# Patient Record
Sex: FEMALE | Race: BLACK OR AFRICAN AMERICAN | NOT HISPANIC OR LATINO | URBAN - METROPOLITAN AREA
[De-identification: names, ages, dates, MRNs, and addresses within clinical notes are randomized per-mention and may not be internally consistent; named-entity substitution may affect disease eponyms.]

---

## 2024-09-18 ENCOUNTER — TELEPHONE (OUTPATIENT)
Dept: HEPATOLOGY | Facility: CLINIC | Age: 69
End: 2024-09-18
Payer: COMMERCIAL

## 2024-09-18 NOTE — TELEPHONE ENCOUNTER
Spoke with patient and scheduled appointment for 09/20/2024 at 8:30 am with Dr. Samara Moss at [facility] in [place].

## 2024-09-18 NOTE — TELEPHONE ENCOUNTER
----- Message from Camila Chávez RN sent at 9/17/2024  3:16 PM CDT -----    ----- Message -----  From: Melissa Carter MA  Sent: 9/17/2024   9:36 AM CDT  To: [facility] Hepatology Coordinator      ----- Message -----  From: Griselda Porter  Sent: 9/17/2024   9:32 AM CDT  To: [facility] Staff    Patient is calling to check on referral. Please callback [phone]

## 2024-09-20 ENCOUNTER — LAB VISIT (OUTPATIENT)
Dept: LAB | Facility: HOSPITAL | Age: 69
End: 2024-09-20
Attending: INTERNAL MEDICINE
Payer: COMMERCIAL

## 2024-09-20 ENCOUNTER — OFFICE VISIT (OUTPATIENT)
Dept: HEPATOLOGY | Facility: CLINIC | Age: 69
End: 2024-09-20
Payer: COMMERCIAL

## 2024-09-20 VITALS
SYSTOLIC BLOOD PRESSURE: 150 MMHG | BODY MASS INDEX: 27.15 KG/M2 | DIASTOLIC BLOOD PRESSURE: 81 MMHG | HEART RATE: 88 BPM | HEIGHT: 65 IN | WEIGHT: 162.94 LBS

## 2024-09-20 DIAGNOSIS — K75.89 CHOLESTATIC HEPATITIS: ICD-10-CM

## 2024-09-20 DIAGNOSIS — R93.5 ABNORMAL FINDINGS ON DIAGNOSTIC IMAGING OF ABDOMEN: ICD-10-CM

## 2024-09-20 DIAGNOSIS — K75.89 CHOLESTATIC HEPATITIS: Primary | ICD-10-CM

## 2024-09-20 LAB
ALBUMIN SERPL BCP-MCNC: 3.6 G/DL (ref 3.5–5.2)
ALP SERPL-CCNC: 305 U/L (ref 55–135)
ALT SERPL W/O P-5'-P-CCNC: 27 U/L (ref 10–44)
ANION GAP SERPL CALC-SCNC: 10 MMOL/L (ref 8–16)
AST SERPL-CCNC: 36 U/L (ref 10–40)
BASOPHILS # BLD AUTO: 0.04 K/UL (ref 0–0.2)
BASOPHILS NFR BLD: 0.6 % (ref 0–1.9)
BILIRUB SERPL-MCNC: 0.4 MG/DL (ref 0.1–1)
BUN SERPL-MCNC: 10 MG/DL (ref 8–23)
CALCIUM SERPL-MCNC: 10 MG/DL (ref 8.7–10.5)
CHLORIDE SERPL-SCNC: 107 MMOL/L (ref 95–110)
CO2 SERPL-SCNC: 25 MMOL/L (ref 23–29)
CREAT SERPL-MCNC: 0.8 MG/DL (ref 0.5–1.4)
DIFFERENTIAL METHOD BLD: ABNORMAL
EOSINOPHIL # BLD AUTO: 0.1 K/UL (ref 0–0.5)
EOSINOPHIL NFR BLD: 1.1 % (ref 0–8)
ERYTHROCYTE [DISTWIDTH] IN BLOOD BY AUTOMATED COUNT: 16.3 % (ref 11.5–14.5)
EST. GFR  (NO RACE VARIABLE): >60 ML/MIN/1.73 M^2
GGT SERPL-CCNC: 570 U/L (ref 8–55)
GLUCOSE SERPL-MCNC: 124 MG/DL (ref 70–110)
HAV IGG SER QL IA: REACTIVE
HBV SURFACE AB SER-ACNC: <3 MIU/ML
HBV SURFACE AB SER-ACNC: NORMAL M[IU]/ML
HBV SURFACE AG SERPL QL IA: NORMAL
HCT VFR BLD AUTO: 36 % (ref 37–48.5)
HGB BLD-MCNC: 11.4 G/DL (ref 12–16)
HIV 1+2 AB+HIV1 P24 AG SERPL QL IA: NORMAL
IMM GRANULOCYTES # BLD AUTO: 0.02 K/UL (ref 0–0.04)
IMM GRANULOCYTES NFR BLD AUTO: 0.3 % (ref 0–0.5)
LYMPHOCYTES # BLD AUTO: 2 K/UL (ref 1–4.8)
LYMPHOCYTES NFR BLD: 32.4 % (ref 18–48)
MCH RBC QN AUTO: 27.3 PG (ref 27–31)
MCHC RBC AUTO-ENTMCNC: 31.7 G/DL (ref 32–36)
MCV RBC AUTO: 86 FL (ref 82–98)
MONOCYTES # BLD AUTO: 0.5 K/UL (ref 0.3–1)
MONOCYTES NFR BLD: 7.3 % (ref 4–15)
NEUTROPHILS # BLD AUTO: 3.6 K/UL (ref 1.8–7.7)
NEUTROPHILS NFR BLD: 58.3 % (ref 38–73)
NRBC BLD-RTO: 0 /100 WBC
PLATELET # BLD AUTO: 378 K/UL (ref 150–450)
PMV BLD AUTO: 10.1 FL (ref 9.2–12.9)
POTASSIUM SERPL-SCNC: 4.4 MMOL/L (ref 3.5–5.1)
PROT SERPL-MCNC: 8.2 G/DL (ref 6–8.4)
RBC # BLD AUTO: 4.17 M/UL (ref 4–5.4)
SODIUM SERPL-SCNC: 142 MMOL/L (ref 136–145)
WBC # BLD AUTO: 6.2 K/UL (ref 3.9–12.7)

## 2024-09-20 PROCEDURE — 86381 MITOCHONDRIAL ANTIBODY EACH: CPT | Performed by: INTERNAL MEDICINE

## 2024-09-20 PROCEDURE — 86015 ACTIN ANTIBODY EACH: CPT | Performed by: INTERNAL MEDICINE

## 2024-09-20 PROCEDURE — 99999 PR PBB SHADOW E&M-EST. PATIENT-LVL IV: CPT | Mod: PBBFAC,,, | Performed by: INTERNAL MEDICINE

## 2024-09-20 PROCEDURE — 80053 COMPREHEN METABOLIC PANEL: CPT | Performed by: INTERNAL MEDICINE

## 2024-09-20 PROCEDURE — 87389 HIV-1 AG W/HIV-1&-2 AB AG IA: CPT | Performed by: INTERNAL MEDICINE

## 2024-09-20 PROCEDURE — 82977 ASSAY OF GGT: CPT | Performed by: INTERNAL MEDICINE

## 2024-09-20 PROCEDURE — 86038 ANTINUCLEAR ANTIBODIES: CPT | Performed by: INTERNAL MEDICINE

## 2024-09-20 PROCEDURE — 87340 HEPATITIS B SURFACE AG IA: CPT | Performed by: INTERNAL MEDICINE

## 2024-09-20 PROCEDURE — 86706 HEP B SURFACE ANTIBODY: CPT | Mod: 91 | Performed by: INTERNAL MEDICINE

## 2024-09-20 PROCEDURE — 85025 COMPLETE CBC W/AUTO DIFF WBC: CPT | Performed by: INTERNAL MEDICINE

## 2024-09-20 PROCEDURE — 36415 COLL VENOUS BLD VENIPUNCTURE: CPT | Performed by: INTERNAL MEDICINE

## 2024-09-20 PROCEDURE — 86039 ANTINUCLEAR ANTIBODIES (ANA): CPT | Performed by: INTERNAL MEDICINE

## 2024-09-20 PROCEDURE — 86235 NUCLEAR ANTIGEN ANTIBODY: CPT | Mod: 59 | Performed by: INTERNAL MEDICINE

## 2024-09-20 PROCEDURE — 86790 VIRUS ANTIBODY NOS: CPT | Performed by: INTERNAL MEDICINE

## 2024-09-20 RX ORDER — BLOOD SUGAR DIAGNOSTIC
STRIP MISCELLANEOUS
COMMUNITY

## 2024-09-20 RX ORDER — LEVOCETIRIZINE DIHYDROCHLORIDE 5 MG/1
1 TABLET, FILM COATED ORAL NIGHTLY
COMMUNITY
Start: 2024-07-08

## 2024-09-20 RX ORDER — ATORVASTATIN CALCIUM 80 MG/1
80 TABLET, FILM COATED ORAL
COMMUNITY

## 2024-09-20 RX ORDER — LOSARTAN POTASSIUM 100 MG/1
100 TABLET ORAL
COMMUNITY

## 2024-09-20 RX ORDER — DULAGLUTIDE 1.5 MG/.5ML
1.5 INJECTION, SOLUTION SUBCUTANEOUS WEEKLY
COMMUNITY

## 2024-09-20 RX ORDER — AMLODIPINE BESYLATE 10 MG/1
1 TABLET ORAL EVERY MORNING
COMMUNITY

## 2024-09-20 RX ORDER — METFORMIN HYDROCHLORIDE 500 MG/1
1000 TABLET ORAL
COMMUNITY
Start: 2024-08-13

## 2024-09-20 RX ORDER — PANTOPRAZOLE SODIUM 40 MG/1
40 TABLET, DELAYED RELEASE ORAL
COMMUNITY
Start: 2024-07-30

## 2024-09-20 NOTE — PROGRESS NOTES
Subjective:     Alayna Sampson is here for evaluation of Abnormal Abdominal/Liver Imaging and Mass      HPI  Alayna Sampson is here for evaluation of abnormal imaging and concern for an autoimmune liver disease.  Of note the patient had been in her usual state of health and had gotten regular labs with some abnormalities in the liver tests.  Seems like mostly elevated alkaline phosphatase.  Evaluation was started imaging suggested a masslike lesion in the liver causing some biliary dilatation.  It seems like there were concern for Klatskin tumor.  She had a biopsy that did not show malignancy but showed concern for possible autoimmune hepatitis although her AST and ALT are normal.  It seems like she primarily has not elevation of alkaline phosphatase.  A year ago her labs were normal.     Outside labs were reviewed and hepatitis C was negative, AB positive, antimitochondrial antibody negative IgG subclasses normal    Overall she has been feeling fairly well.  Initially she was having some right upper quadrant abdominal pain with eating but then was treated for H pylori and reports that got better now it was more rare and intermittent and mild in severity.  She has noticed slight decrease in appetite but for the most part has not felt sick.  She denies any known history of liver disease.    8/2024  Liver, mass, core biopsy:                                               - Liver parenchyma with areas of collapse, negative for malignancy,     see comment.       7/2024 MRCP  Infiltrative masslike process of the central aspects of the right and left lobes of the liver which surrounds the central biliary tree and causes beading and stricturing of the intrahepatic biliary tree. Consider Klatskin's tumor (cholangiocarcinoma), but infectious or inflammatory cholangitis or less likely primary biliary cirrhosis are secondary considerations. Biopsy is recommended.     No evidence of high-grade intrahepatic or extrahepatic  "bile duct obstruction.     Review of Systems    Objective:     Physical Exam  Vitals reviewed.   Constitutional:       General: She is not in acute distress.     Appearance: She is well-developed.   HENT:      Head: Normocephalic and atraumatic.      Mouth/Throat:      Pharynx: No oropharyngeal exudate.   Eyes:      General: No scleral icterus.        Right eye: No discharge.         Left eye: No discharge.      Conjunctiva/sclera: Conjunctivae normal.      Pupils: Pupils are equal, round, and reactive to light.   Pulmonary:      Effort: Pulmonary effort is normal. No respiratory distress.      Breath sounds: Normal breath sounds. No wheezing.   Abdominal:      General: There is no distension.      Palpations: Abdomen is soft.      Tenderness: There is no abdominal tenderness.   Musculoskeletal:      Right lower leg: No edema.      Left lower leg: No edema.   Neurological:      Mental Status: She is alert and oriented to person, place, and time.   Psychiatric:         Behavior: Behavior normal.         Computed MELD 3.0 unavailable. One or more values for this score either were not found within the given timeframe or did not fit some other criterion.  Computed MELD-Na unavailable. One or more values for this score either were not found within the given timeframe or did not fit some other criterion.      No results found for: "WBC", "HGB", "HCT", "PLT"  No results found for: "BUN", "CREATININE", "GLU", "CALCIUM", "NA", "K", "CL", "MG"  No results found for: "AST", "ALT", "ALKPHOS", "BILITOT", "ALBUMIN"  INR   Date Value Ref Range Status   08/21/2024 1.1  Final         Assessment/Plan:     1. Cholestatic hepatitis    2. Abnormal findings on diagnostic imaging of abdomen      Alayna Sampson is a 68 y.o. female withAbnormal Abdominal/Liver Imaging and Mass    Cholestatic hepatitis-unclear etiology; need to evaluate all of the information that she has already had gathered.  The complete picture does not come together as " her pattern of injury is not consistent with an autoimmune hepatitis with looks like more of a biliary pattern of the injury but further evaluation is needed.  -would like to have her outside imaging reviewed at radiology conference  -liver pathology sent for pathology conference review  -will get some labs updated and repeated  -     Anti-Smooth Muscle Antibody; Future; Expected date: 09/20/2024  -     AB Screen w/Reflex; Future; Expected date: 09/20/2024  -     Antimitochondrial Antibody; Future; Expected date: 09/20/2024  -     Gamma GT; Standing  -     Hepatitis A antibody, IgG; Future; Expected date: 09/20/2024  -     Comprehensive Metabolic Panel; Standing  -     CBC Auto Differential; Standing  -     Hepatitis B Surface Ab, Qualitative; Future; Expected date: 09/20/2024  -     Hepatitis B Surface Antigen; Future; Expected date: 09/20/2024  -     HIV 1/2 Ag/Ab (4th Gen); Future; Expected date: 09/20/2024  -     Specimen to Pathology Liver; Future; Expected date: 09/20/2024    Abnormal findings on diagnostic imaging of abdomen  -will have imaging reviewed at radiology conference  -patient may need an EUS for further characterization and repeat biopsy  -     Comprehensive Metabolic Panel; Standing  -     CBC Auto Differential; Standing  -     HIV 1/2 Ag/Ab (4th Gen); Future; Expected date: 09/20/2024  -     Specimen to Pathology Liver; Future; Expected date: 09/20/2024      Return to clinic in 3 months with preclinic labs but will be coordinating care in the meantime    Samara Moss MD

## 2024-09-23 LAB
ANA PATTERN 1: NORMAL
ANA SER QL IF: POSITIVE
ANA TITR SER IF: NORMAL {TITER}

## 2024-09-24 LAB
MITOCHONDRIA AB TITR SER IF: NORMAL {TITER}
SMOOTH MUSCLE AB TITR SER IF: NORMAL {TITER}

## 2024-09-25 LAB
ANTI SM ANTIBODY: 0.07 RATIO (ref 0–0.99)
ANTI SM/RNP ANTIBODY: 0.06 RATIO (ref 0–0.99)
ANTI-SM INTERPRETATION: NEGATIVE
ANTI-SM/RNP INTERPRETATION: NEGATIVE
ANTI-SSA ANTIBODY: 0.07 RATIO (ref 0–0.99)
ANTI-SSA INTERPRETATION: NEGATIVE
ANTI-SSB ANTIBODY: 0.07 RATIO (ref 0–0.99)
ANTI-SSB INTERPRETATION: NEGATIVE
DSDNA AB SER-ACNC: NORMAL [IU]/ML

## 2024-10-15 ENCOUNTER — PATIENT MESSAGE (OUTPATIENT)
Dept: HEPATOLOGY | Facility: CLINIC | Age: 69
End: 2024-10-15
Payer: COMMERCIAL

## 2024-10-16 ENCOUNTER — TELEPHONE (OUTPATIENT)
Dept: HEPATOLOGY | Facility: CLINIC | Age: 69
End: 2024-10-16
Payer: COMMERCIAL

## 2024-10-16 NOTE — TELEPHONE ENCOUNTER
"Patient: Alayna Sampson       MRN: [id number]      : 1955     Age: 68 y.o.  [address]  [place] [address]    Presenting Radiologists:     Providers: Samara Moss MD    Priority of review: Other    Patient Transplant Status: Hepatology    Reason for presentation: Indeterminate lesion    Clinical Summary: 68F with cholestatic hep imaging was concerning for Klatskin but bx neg for malignancy outside questions autoimmune hepatitis but actually labs and bx not suggestive of autoimmune hep, need to make sure no concern for cancer.     Imaging to be reviewed: Outside imaging     HCC Treatment History: n/a    Platelets:   Lab Results   Component Value Date/Time     2024 09:15 AM     Creatinine:   Lab Results   Component Value Date/Time    CREATININE 0.8 2024 09:15 AM     Bilirubin:   Lab Results   Component Value Date/Time    BILITOT 0.4 2024 09:15 AM     AFP Last 3 each if available: No results found for: "AFP", "EXTAFP"    MELD: Computed MELD 3.0 unavailable. One or more values for this score either were not found within the given timeframe or did not fit some other criterion.  Computed MELD-Na unavailable. One or more values for this score either were not found within the given timeframe or did not fit some other criterion.      Plan:     Follow-up Provider:   "

## 2024-10-16 NOTE — TELEPHONE ENCOUNTER
We have reports of patient's imaging we need her actual imaging to be uploaded so it can be reviewed at IR conference.  Please let me know what we are in terms of getting the actual disc it having the images uploaded.

## 2024-10-16 NOTE — TELEPHONE ENCOUNTER
Patient just dropped them off this morning. Radiology will have them uploaded within 20 minutes or so.

## 2024-10-21 ENCOUNTER — TELEPHONE (OUTPATIENT)
Dept: HEPATOLOGY | Facility: HOSPITAL | Age: 69
End: 2024-10-21
Payer: COMMERCIAL

## 2024-10-21 NOTE — TELEPHONE ENCOUNTER
IR Liver Pathology Conference Note    Patient:  Alayna Sampson  MRN:   [id number]  YOB: 1955  Date of Transplant:  N/A  Native Diagnosis:     Discussion/Plan:    Presenter: Hepatologist - Samara Moss MD    Reason for presenting: diagnosis confirmation    Concerns for Pathologists:  69-year-old woman with cholestatic hepatitis and concern for possible bile duct mass but when she had a biopsy there was no evidence of a mass on the biopsy.  Referring doctors we then concern for autoimmune hepatitis.  Labs primarily of a cholestatic picture with an elevated alkaline phosphatase but the bilirubin is normal.  Would like to review outside slides.    Lab Results  WBC   Date Value   09/20/2024 6.20 K/uL   05/29/2024 6.0 x10E3/uL   04/05/2023 5.7 x10E3/uL   10/13/2020 7.9 x10E3/uL     PLT   Date Value   09/20/2024 378 K/uL   05/29/2024 368 x10E3/uL   04/05/2023 354 x10E3/uL   10/13/2020 324 x10E3/uL     INR (no units)   Date Value   08/21/2024 1.1     AST (U/L)   Date Value   09/20/2024 36     ALT (U/L)   Date Value   09/20/2024 27     BILITOT (mg/dL)   Date Value   09/20/2024 0.4     ALKPHOS (U/L)   Date Value   09/20/2024 305 (H)     CREATININE (mg/dL)   Date Value   09/20/2024 0.8

## 2024-10-22 ENCOUNTER — TELEPHONE (OUTPATIENT)
Dept: ENDOSCOPY | Facility: HOSPITAL | Age: 69
End: 2024-10-22
Payer: COMMERCIAL

## 2024-10-22 ENCOUNTER — CONFERENCE (OUTPATIENT)
Dept: TRANSPLANT | Facility: CLINIC | Age: 69
End: 2024-10-22
Payer: COMMERCIAL

## 2024-10-22 ENCOUNTER — TELEPHONE (OUTPATIENT)
Dept: GASTROENTEROLOGY | Facility: CLINIC | Age: 69
End: 2024-10-22
Payer: COMMERCIAL

## 2024-10-22 NOTE — TELEPHONE ENCOUNTER
----- Message from Griselda Sotelo sent at 10/22/2024 10:21 AM CDT -----  Regarding: urgent appointment      Good Morning,       This patient was reviewed in Liver Conference with the recommendation for ERCP with brushings to rule out hilar cholangiocarcinoma.  Please assist the patient with an appointment.  Dr Moss will be informing the patient later today.      Thanks  Jo-Ann

## 2024-10-22 NOTE — TELEPHONE ENCOUNTER
"Patient: Alayna Sampson       MRN: [id number]      : 1955     Age: 68 y.o.  [address]  [place] [address]     Presenting Radiologists: Daryl Christie MD     Providers: Samara Moss MD     Priority of review: Other     Patient Transplant Status: Hepatology     Reason for presentation: Indeterminate lesion     Clinical Summary: 68F with cholestatic hep imaging was concerning for Klatskin but bx neg for malignancy outside questions autoimmune hepatitis but actually labs and bx not suggestive of autoimmune hep, need to make sure no concern for cancer.      Imaging to be reviewed: Outside imaging      HCC Treatment History: n/a     Platelets:         Lab Results   Component Value Date/Time      2024 09:15 AM      Creatinine:         Lab Results   Component Value Date/Time     CREATININE 0.8 2024 09:15 AM      Bilirubin:         Lab Results   Component Value Date/Time     BILITOT 0.4 2024 09:15 AM      AFP Last 3 each if available: No results found for: "AFP", "EXTAFP"     MELD: Computed MELD 3.0 unavailable. One or more values for this score either were not found within the given timeframe or did not fit some other criterion.  Computed MELD-Na unavailable. One or more values for this score either were not found within the given timeframe or did not fit some other criterion.        Plan: Difficult to evaluate outside MRI, which shows intrahepatic duct dilation with central stricture and central duct enhancement.  Percutaneous biopsy was done on .  Would consider ERCP and brushing.          Discussion: Previous BX ws done percutaneously,which is kind of blindly BX.   For a clad skin type tumor it is more  advantageous to do ERCP. Needs an ERCP with brushings if suspicious for cholangio.   The duct needs to be BX .  ERCP with brushing to look for a hilar  cholangio.  It does look strictured , there is enhancement of the ducts, which could be related to an inflammatory process.  The most " appropriate approach to tissue sample is probably ERCP.   Needs updated imaging  ([facility], or main campus for a better quality) and CA 19-9  Follow-up Provider:Dr Moss

## 2024-10-23 ENCOUNTER — PATIENT MESSAGE (OUTPATIENT)
Dept: ENDOSCOPY | Facility: HOSPITAL | Age: 69
End: 2024-10-23
Payer: COMMERCIAL

## 2024-10-23 ENCOUNTER — TELEPHONE (OUTPATIENT)
Dept: ENDOSCOPY | Facility: HOSPITAL | Age: 69
End: 2024-10-23
Payer: COMMERCIAL

## 2024-10-23 DIAGNOSIS — C24.0 KLATSKIN'S TUMOR: Primary | ICD-10-CM

## 2024-10-23 NOTE — TELEPHONE ENCOUNTER
Spoke to pt to schedule procedure(s) ERCP        Physician to perform procedure(s) Dr. WALLY Salinas  Date of Procedure (s) 10/24/24  Arrival Time 9:00 AM  Time of Procedure(s) 10:00 AM   Location of Procedure(s) [place] 2nd Floor  Type of Rx Prep sent to patient: N/A  Instructions provided to patient via [facility]    Patient was informed on the following information and verbalized understanding. Screening questionnaire reviewed with patient and complete. If procedure requires anesthesia, a responsible adult needs to be present to accompany the patient home, patient cannot drive after receiving anesthesia. Appointment details are tentative, especially check-in time. Patient will receive a prep-op call 7 days prior to confirm check-in time for procedure. If applicable the patient should contact their pharmacy to verify Rx for procedure prep is ready for pick-up. Patient was advised to call the scheduling department at [phone] if pharmacy states no Rx is available. Patient was advised to call the endoscopy scheduling department if any questions or concerns arise.      [facility] Scheduling Department

## 2024-10-23 NOTE — TELEPHONE ENCOUNTER
Called pt to schedule urgent ERCP procedure with no answer. Voicemail left with direct phone number for return call to schedule.

## 2024-10-24 ENCOUNTER — ANESTHESIA EVENT (OUTPATIENT)
Dept: ENDOSCOPY | Facility: HOSPITAL | Age: 69
End: 2024-10-24
Payer: COMMERCIAL

## 2024-10-24 ENCOUNTER — CONFERENCE (OUTPATIENT)
Dept: TRANSPLANT | Facility: CLINIC | Age: 69
End: 2024-10-24
Payer: COMMERCIAL

## 2024-10-24 ENCOUNTER — HOSPITAL ENCOUNTER (OUTPATIENT)
Facility: HOSPITAL | Age: 69
Discharge: HOME | End: 2024-10-24
Attending: INTERNAL MEDICINE | Admitting: INTERNAL MEDICINE
Payer: COMMERCIAL

## 2024-10-24 ENCOUNTER — ANESTHESIA (OUTPATIENT)
Dept: ENDOSCOPY | Facility: HOSPITAL | Age: 69
End: 2024-10-24
Payer: COMMERCIAL

## 2024-10-24 VITALS
BODY MASS INDEX: 26.66 KG/M2 | OXYGEN SATURATION: 98 % | RESPIRATION RATE: 17 BRPM | SYSTOLIC BLOOD PRESSURE: 122 MMHG | HEART RATE: 57 BPM | TEMPERATURE: 98 F | WEIGHT: 160 LBS | HEIGHT: 65 IN | DIASTOLIC BLOOD PRESSURE: 58 MMHG

## 2024-10-24 DIAGNOSIS — R79.89 ELEVATED LFTS: ICD-10-CM

## 2024-10-24 LAB
POCT GLUCOSE: 150 MG/DL (ref 70–110)
POCT GLUCOSE: 160 MG/DL (ref 70–110)

## 2024-10-24 PROCEDURE — 43262 ENDO CHOLANGIOPANCREATOGRAPH: CPT | Mod: ,,, | Performed by: INTERNAL MEDICINE

## 2024-10-24 PROCEDURE — 74328 X-RAY BILE DUCT ENDOSCOPY: CPT | Mod: TC | Performed by: INTERNAL MEDICINE

## 2024-10-24 PROCEDURE — 25000003 PHARM REV CODE 250: Performed by: NURSE ANESTHETIST, CERTIFIED REGISTERED

## 2024-10-24 PROCEDURE — 63600175 PHARM REV CODE 636 W HCPCS: Performed by: ANESTHESIOLOGY

## 2024-10-24 PROCEDURE — 27201674 HC SPHINCTERTOME: Performed by: INTERNAL MEDICINE

## 2024-10-24 PROCEDURE — 43262 ENDO CHOLANGIOPANCREATOGRAPH: CPT | Performed by: INTERNAL MEDICINE

## 2024-10-24 PROCEDURE — 27200946 HC BRUSH, CYTOLOGY: Performed by: INTERNAL MEDICINE

## 2024-10-24 PROCEDURE — 25500020 PHARM REV CODE 255: Performed by: INTERNAL MEDICINE

## 2024-10-24 PROCEDURE — C1769 GUIDE WIRE: HCPCS | Performed by: INTERNAL MEDICINE

## 2024-10-24 PROCEDURE — 88112 CYTOPATH CELL ENHANCE TECH: CPT | Mod: 26,,, | Performed by: PATHOLOGY

## 2024-10-24 PROCEDURE — 43259 EGD US EXAM DUODENUM/JEJUNUM: CPT | Performed by: INTERNAL MEDICINE

## 2024-10-24 PROCEDURE — 25000003 PHARM REV CODE 250: Performed by: INTERNAL MEDICINE

## 2024-10-24 PROCEDURE — 37000008 HC ANESTHESIA 1ST 15 MINUTES: Performed by: INTERNAL MEDICINE

## 2024-10-24 PROCEDURE — 82962 GLUCOSE BLOOD TEST: CPT | Mod: 91 | Performed by: INTERNAL MEDICINE

## 2024-10-24 PROCEDURE — 37000009 HC ANESTHESIA EA ADD 15 MINS: Performed by: INTERNAL MEDICINE

## 2024-10-24 PROCEDURE — 63600175 PHARM REV CODE 636 W HCPCS: Performed by: NURSE ANESTHETIST, CERTIFIED REGISTERED

## 2024-10-24 PROCEDURE — 74328 X-RAY BILE DUCT ENDOSCOPY: CPT | Mod: 26,,, | Performed by: INTERNAL MEDICINE

## 2024-10-24 PROCEDURE — 43259 EGD US EXAM DUODENUM/JEJUNUM: CPT | Mod: 51,,, | Performed by: INTERNAL MEDICINE

## 2024-10-24 PROCEDURE — 82962 GLUCOSE BLOOD TEST: CPT | Performed by: INTERNAL MEDICINE

## 2024-10-24 PROCEDURE — 88112 CYTOPATH CELL ENHANCE TECH: CPT | Performed by: PATHOLOGY

## 2024-10-24 PROCEDURE — 27202304 HC CANNULA, ERCP: Performed by: INTERNAL MEDICINE

## 2024-10-24 RX ORDER — DEXMEDETOMIDINE HYDROCHLORIDE 100 UG/ML
INJECTION, SOLUTION INTRAVENOUS
Status: DISCONTINUED | OUTPATIENT
Start: 2024-10-24 | End: 2024-10-24

## 2024-10-24 RX ORDER — SODIUM CHLORIDE 9 MG/ML
INJECTION, SOLUTION INTRAVENOUS CONTINUOUS
Status: DISCONTINUED | OUTPATIENT
Start: 2024-10-24 | End: 2024-10-24 | Stop reason: HOSPADM

## 2024-10-24 RX ORDER — OXYCODONE AND ACETAMINOPHEN 5; 325 MG/1; MG/1
1 TABLET ORAL EVERY 6 HOURS PRN
Qty: 20 TABLET | Refills: 0 | Status: SHIPPED | OUTPATIENT
Start: 2024-10-24

## 2024-10-24 RX ORDER — INDOMETHACIN 50 MG/1
SUPPOSITORY RECTAL
Status: COMPLETED | OUTPATIENT
Start: 2024-10-24 | End: 2024-10-24

## 2024-10-24 RX ORDER — SODIUM CHLORIDE 0.9 % (FLUSH) 0.9 %
10 SYRINGE (ML) INJECTION
Status: DISCONTINUED | OUTPATIENT
Start: 2024-10-24 | End: 2024-10-24 | Stop reason: HOSPADM

## 2024-10-24 RX ORDER — FENTANYL CITRATE 50 UG/ML
25 INJECTION, SOLUTION INTRAMUSCULAR; INTRAVENOUS EVERY 5 MIN PRN
Status: DISCONTINUED | OUTPATIENT
Start: 2024-10-24 | End: 2024-10-24 | Stop reason: HOSPADM

## 2024-10-24 RX ORDER — PHENYLEPHRINE HYDROCHLORIDE 10 MG/ML
INJECTION INTRAVENOUS
Status: DISCONTINUED | OUTPATIENT
Start: 2024-10-24 | End: 2024-10-24

## 2024-10-24 RX ORDER — PROPOFOL 10 MG/ML
VIAL (ML) INTRAVENOUS CONTINUOUS PRN
Status: DISCONTINUED | OUTPATIENT
Start: 2024-10-24 | End: 2024-10-24

## 2024-10-24 RX ORDER — LIDOCAINE HYDROCHLORIDE 20 MG/ML
INJECTION INTRAVENOUS
Status: DISCONTINUED | OUTPATIENT
Start: 2024-10-24 | End: 2024-10-24

## 2024-10-24 RX ORDER — ONDANSETRON HYDROCHLORIDE 8 MG/1
8 TABLET, FILM COATED ORAL EVERY 8 HOURS PRN
Qty: 15 TABLET | Refills: 0 | Status: SHIPPED | OUTPATIENT
Start: 2024-10-24

## 2024-10-24 RX ORDER — GLUCAGON 1 MG
1 KIT INJECTION
Status: DISCONTINUED | OUTPATIENT
Start: 2024-10-24 | End: 2024-10-24 | Stop reason: HOSPADM

## 2024-10-24 RX ORDER — ONDANSETRON HYDROCHLORIDE 2 MG/ML
4 INJECTION, SOLUTION INTRAVENOUS DAILY PRN
Status: DISCONTINUED | OUTPATIENT
Start: 2024-10-24 | End: 2024-10-24 | Stop reason: HOSPADM

## 2024-10-24 RX ADMIN — DEXMEDETOMIDINE 12 MCG: 100 INJECTION, SOLUTION, CONCENTRATE INTRAVENOUS at 12:10

## 2024-10-24 RX ADMIN — PHENYLEPHRINE HYDROCHLORIDE 100 MCG: 10 INJECTION INTRAVENOUS at 12:10

## 2024-10-24 RX ADMIN — FENTANYL CITRATE 25 MCG: 50 INJECTION, SOLUTION INTRAMUSCULAR; INTRAVENOUS at 12:10

## 2024-10-24 RX ADMIN — PHENYLEPHRINE HYDROCHLORIDE 200 MCG: 10 INJECTION INTRAVENOUS at 12:10

## 2024-10-24 RX ADMIN — LIDOCAINE HYDROCHLORIDE 80 MG: 20 INJECTION INTRAVENOUS at 11:10

## 2024-10-24 RX ADMIN — PROPOFOL 200 MCG/KG/MIN: 10 INJECTION, EMULSION INTRAVENOUS at 11:10

## 2024-10-24 RX ADMIN — PROPOFOL 100 MG: 10 INJECTION, EMULSION INTRAVENOUS at 11:10

## 2024-10-24 RX ADMIN — GLYCOPYRROLATE 0.2 MG: 0.2 INJECTION, SOLUTION INTRAMUSCULAR; INTRAVENOUS at 11:10

## 2024-10-24 NOTE — ANESTHESIA POSTPROCEDURE EVALUATION
Anesthesia Post Evaluation    Patient: Alayna Sampson    Procedure(s) Performed: Procedure(s) (LRB):  ERCP (ENDOSCOPIC RETROGRADE CHOLANGIOPANCREATOGRAPHY) (N/A)  ULTRASOUND, UPPER GI TRACT, ENDOSCOPIC    Final Anesthesia Type: general      Patient location during evaluation: PACU  Patient participation: Yes- Able to Participate  Level of consciousness: awake and alert and oriented  Post-procedure vital signs: reviewed and stable  Pain management: adequate  Airway patency: patent    PONV status at discharge: No PONV  Anesthetic complications: no      Cardiovascular status: blood pressure returned to baseline and hemodynamically stable  Respiratory status: unassisted, spontaneous ventilation and room air  Hydration status: euvolemic  Follow-up not needed.              Vitals Value Taken Time   /58 10/24/24 1400   Temp 36.4 °C (97.5 °F) 10/24/24 1400   Pulse 57 10/24/24 1400   Resp 17 10/24/24 1400   SpO2 98 % 10/24/24 1400         No case tracking events are documented in the log.      Pain/Paul Score: Pain Rating Prior to Med Admin: 9 (10/24/2024 12:50 PM)  Paul Score: 10 (10/24/2024  2:00 PM)

## 2024-10-24 NOTE — TRANSFER OF CARE
"Anesthesia Transfer of Care Note    Patient: Alayna Sampson    Procedure(s) Performed: Procedure(s) (LRB):  ERCP (ENDOSCOPIC RETROGRADE CHOLANGIOPANCREATOGRAPHY) (N/A)  ULTRASOUND, UPPER GI TRACT, ENDOSCOPIC    Patient location: [facility]    Anesthesia Type: general    Transport from OR: Transported from OR on 2-3 L/min O2 by NC with adequate spontaneous ventilation    Post pain: adequate analgesia    Post assessment: no apparent anesthetic complications and tolerated procedure well    Post vital signs: stable    Level of consciousness: awake    Nausea/Vomiting: no nausea/vomiting    Complications: none    Transfer of care protocol was followed    Last vitals: Visit Vitals  BP (!) 152/70 (BP Location: Left arm, Patient Position: Lying)   Pulse 75   Temp 36.5 °C (97.7 °F) (Temporal)   Resp 17   Ht 5' 5" (1.651 m)   Wt 72.6 kg (160 lb)   SpO2 99%   Breastfeeding No   BMI 26.63 kg/m²     "

## 2024-10-24 NOTE — ANESTHESIA PREPROCEDURE EVALUATION
[facility]  Anesthesia Pre-Operative Evaluation         Patient Name/: Alayna Sampson, 1955  MRN: [id number]    SUBJECTIVE:     Pre-operative evaluation for Procedure(s) (LRB):  ERCP (ENDOSCOPIC RETROGRADE CHOLANGIOPANCREATOGRAPHY) (N/A)  ULTRASOUND, UPPER GI TRACT, ENDOSCOPIC     10/24/2024    Alayna Sampson is a 69 y.o. female w/ a significant PMHx of HTN and DM on Trulicity who presents due to cholestatic hepatitis and concern for possible bile duct mass.    Patient now presents for the above procedure(s).    ________________________________________  No results found for this or any previous visit.    ________________________________________    LDA:        Peripheral IV - Single Lumen 10/24/24 1055 20 G Right Antecubital (Active)   Site Assessment Clean;Dry;Intact 10/24/24 1055   Number of days: 0       Drips:       There is no problem list on file for this patient.      Review of patient's allergies indicates:   Allergen Reactions    Lisinopril Other (See Comments)     COUGH    Codeine Rash       Current Inpatient Medications:       No current facility-administered medications on file prior to encounter.     Current Outpatient Medications on File Prior to Encounter   Medication Sig Dispense Refill    amLODIPine (NORVASC) 10 MG tablet Take 1 tablet by mouth every morning.      atorvastatin (LIPITOR) 80 MG tablet Take 80 mg by mouth.      levocetirizine (XYZAL) 5 MG tablet Take 1 tablet by mouth every evening.      losartan (COZAAR) 100 MG tablet Take 100 mg by mouth.      metFORMIN (GLUCOPHAGE) 500 MG tablet Take 1,000 mg by mouth.      ACCU-CHEK GUIDE TEST STRIPS Strp USE 1 NEW STRIP THREE TIMES DAILY AS NEEDED      pantoprazole (PROTONIX) 40 MG tablet Take 40 mg by mouth.      TRULICITY 1.5 mg/0.5 mL pen injector Inject 1.5 mg into the skin once a week.         History reviewed. No pertinent surgical history.    Social History:  Tobacco Use: Low Risk  (10/24/2024)    Patient History      Smoking Tobacco Use: Never     Smokeless Tobacco Use: Never     Passive Exposure: Never       Alcohol Use: Patient Declined (10/16/2024)    Received from [facility]    AUDIT-C     Frequency of Alcohol Consumption: Patient declined     Average Number of Drinks: Patient declined     Frequency of Binge Drinking: Patient declined       OBJECTIVE:     Vital Signs Range:  BMI Readings from Last 1 Encounters:   10/24/24 26.63 kg/m²       Temp:  [36.5 °C (97.7 °F)]   Pulse:  [75]   Resp:  [17]   BP: (152)/(70)   SpO2:  [99 %]        Significant Labs:        Component Value Date/Time    WBC 6.20 09/20/2024 0915    HGB 11.4 (L) 09/20/2024 0915    HCT 36.0 (L) 09/20/2024 0915     09/20/2024 0915     09/20/2024 0915    K 4.4 09/20/2024 0915     09/20/2024 0915    CO2 25 09/20/2024 0915     (H) 09/20/2024 0915    BUN 10 09/20/2024 0915    CREATININE 0.8 09/20/2024 0915    CALCIUM 10.0 09/20/2024 0915    ALBUMIN 3.6 09/20/2024 0915    PROT 8.2 09/20/2024 0915    ALKPHOS 305 (H) 09/20/2024 0915    BILITOT 0.4 09/20/2024 0915    AST 36 09/20/2024 0915    ALT 27 09/20/2024 0915    INR 1.1 08/21/2024 0620    HGBA1C 7.6 (H) 10/13/2020 1055        Please see Results Review for additional labs.     Diagnostic Studies: No relevant studies.    EKG:   No results found for this or any previous visit.    ECHO:  See subjective, if available.      ASSESSMENT/PLAN:                                                                                                                 10/24/2024  Alayna Sampson is a 69 y.o., female.      Pre-op Assessment    I have reviewed the Patient Summary Reports.     I have reviewed the Nursing Notes.    I have reviewed the Medications.     Review of Systems  Anesthesia Hx:  No problems with previous Anesthesia   History of prior surgery of interest to airway management or planning:          Denies Family Hx of Anesthesia complications.    Denies Personal Hx of Anesthesia  complications.                    Hematology/Oncology:    Oncology Normal                                   Cardiovascular:     Hypertension       Denies Angina.        ECG has been reviewed.                            Pulmonary:       Denies Shortness of breath.  Denies Recent URI.                 Renal/:  Renal/ Normal                 Hepatic/GI:      Denies GERD. Denies Liver Disease.               Neurological:    Denies CVA.    Denies Seizures.                                Endocrine:  Diabetes               Physical Exam  General: Well nourished, Alert and Cooperative    Airway:  Mallampati: II   Mouth Opening: Normal  TM Distance: Normal  Tongue: Normal  Neck ROM: Normal ROM    Dental:  Dentures        Anesthesia Plan  Type of Anesthesia, risks & benefits discussed:    Anesthesia Type: Gen Natural Airway  Intra-op Monitoring Plan: Standard ASA Monitors  Post Op Pain Control Plan: multimodal analgesia and IV/PO Opioids PRN  Induction:  IV  Informed Consent: Informed consent signed with the Patient and all parties understand the risks and agree with anesthesia plan.  All questions answered.   ASA Score: 2  Day of Surgery Review of History & Physical: H&P Update referred to the surgeon/provider.    Ready For Surgery From Anesthesia Perspective.     .

## 2024-10-24 NOTE — TELEPHONE ENCOUNTER
10/24/24 Path Conference  10/3/24 biopsy:  No Cholangio, no tumor  Lymphocytic inflammation; stromal edema  Not a hepatitis  This can be adjacent to a mass  No definite AIH

## 2024-10-25 ENCOUNTER — TELEPHONE (OUTPATIENT)
Dept: HEPATOLOGY | Facility: CLINIC | Age: 69
End: 2024-10-25
Payer: COMMERCIAL

## 2024-10-25 DIAGNOSIS — K83.01 PRIMARY SCLEROSING CHOLANGITIS: Primary | ICD-10-CM

## 2024-10-25 RX ORDER — URSODIOL 300 MG/1
300 CAPSULE ORAL 2 TIMES DAILY
Qty: 60 CAPSULE | Refills: 5 | Status: SHIPPED | OUTPATIENT
Start: 2024-10-25 | End: 2025-10-25

## 2024-10-25 NOTE — TELEPHONE ENCOUNTER
Spoke with patient about ERCP.  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  Explained to her that there is no treatment but I would like to start her ursodiol.  Her insurance company chart is more for the 500 mg so will start slightly under dosed at 300 mg twice a day and see how she tolerates.  If she is doing well with that then at her follow-up visit with Dr. Gordon in Dec the dose can be increased.  She needs all the labs that I ordered the week prior to her visit with Dr. Gordon.  If she already has other labs ordered for that same time please make sure they are not duplicated.

## 2024-10-28 LAB
COMMENT: ABNORMAL
FINAL PATHOLOGIC DIAGNOSIS: ABNORMAL
Lab: ABNORMAL
MICROSCOPIC EXAM: ABNORMAL

## 2024-11-07 ENCOUNTER — CONFERENCE (OUTPATIENT)
Dept: TRANSPLANT | Facility: CLINIC | Age: 69
End: 2024-11-07
Payer: COMMERCIAL

## 2024-11-07 NOTE — TELEPHONE ENCOUNTER
11/07/24 Path Conference  10/24/24 bile duct brushing:  A typical cells inflammatory cells  Not diagnostic of malignancy

## 2024-12-10 ENCOUNTER — TELEPHONE (OUTPATIENT)
Dept: HEPATOLOGY | Facility: CLINIC | Age: 69
End: 2024-12-10
Payer: COMMERCIAL

## 2024-12-10 NOTE — TELEPHONE ENCOUNTER
----- Message from Lalitha sent at 12/10/2024 12:39 PM CST -----  Name of Who is Calling:Pt         What is the request in detail:Pt would like a call back to [facility] 12/10 virtual appt. Please advise thank you         Can the clinic reply by [facility]:no        What Number to [facility] Back [facility]:Telephone Information:  Mobile          [phone]

## 2024-12-10 NOTE — TELEPHONE ENCOUNTER
Called pt and informed that we have no soon appointments available.  Gave her first available which was March 11th.  [facility] [place] [facility] .  Will fax to that office.

## 2025-01-08 ENCOUNTER — OFFICE VISIT (OUTPATIENT)
Dept: HEPATOLOGY | Facility: CLINIC | Age: 70
End: 2025-01-08
Payer: COMMERCIAL

## 2025-01-08 DIAGNOSIS — R93.5 ABNORMAL FINDINGS ON DIAGNOSTIC IMAGING OF ABDOMEN: ICD-10-CM

## 2025-01-08 DIAGNOSIS — K83.01 PRIMARY SCLEROSING CHOLANGITIS: Primary | ICD-10-CM

## 2025-01-08 DIAGNOSIS — R10.13 EPIGASTRIC PAIN: ICD-10-CM

## 2025-01-08 DIAGNOSIS — K75.89 CHOLESTATIC HEPATITIS: ICD-10-CM

## 2025-01-08 DIAGNOSIS — K76.9 LIVER DISEASE, UNSPECIFIED: ICD-10-CM

## 2025-01-08 RX ORDER — PANTOPRAZOLE SODIUM 40 MG/1
40 TABLET, DELAYED RELEASE ORAL DAILY
Qty: 30 TABLET | Refills: 1 | Status: SHIPPED | OUTPATIENT
Start: 2025-01-08

## 2025-01-08 NOTE — PATIENT INSTRUCTIONS
Primary Sclerosing Cholangitis  Primary sclerosing (skluh-ROHS-ing) cholangitis (koh-shakeel-LAURENCE-tis) is a disease of the bile ducts. Bile ducts carry the digestive liquid bile from your liver to your small intestine. In primary sclerosing cholangitis, inflammation causes scars within the bile ducts. These scars make the ducts hard and narrow and gradually cause serious liver damage.    In most people with primary sclerosing cholangitis, the disease progresses slowly. It can eventually lead to liver failure, repeated infections, and tumors of the bile duct or liver. A liver transplant is the only known cure for advanced primary sclerosing cholangitis, but the disease may recur in the transplanted liver in a small number of patients.    Care for primary sclerosing cholangitis focuses on monitoring liver function, managing symptoms and, when possible, doing procedures that temporarily open blocked bile ducts.    Symptoms  Primary sclerosing cholangitis is often diagnosed before symptoms appear when a routine blood test or an X-ray taken for an unrelated condition shows liver abnormalities.    Early symptoms often include:    Fatigue  Itching  Many people diagnosed with primary sclerosing cholangitis before they have symptoms continue to feel generally well for several years. But there's no reliable way to predict how quickly or slowly the disease will progress for any individual.    Signs and symptoms that may appear as the disease progresses include:    Pain in the upper right part of the abdomen  Fever  Chills  Night sweats  Enlarged liver  Enlarged spleen  Weight loss  Yellow eyes and skin (jaundice)    When to see a doctor  Make an appointment with your doctor if you have severe, unexplained itching on much of your body -- itching that persists no matter how much you scratch. Also see your doctor if you feel extremely tired all the time, no matter what you do.    It's particularly important to bring unexplained  fatigue and itching to your doctor's attention if you have ulcerative colitis or Crohn's disease, both of which are types of inflammatory bowel disease. A majority of people with primary sclerosing cholangitis also have one of these diseases.    Complications  Complications of primary sclerosing cholangitis may include:    Liver disease and failure. Chronic inflammation of the bile ducts throughout your liver can lead to tissue scarring (cirrhosis), liver cell death and, eventually, loss of liver function.  Repeated infections. If scarring of the bile ducts slows or stops the flow of bile out of the liver, you may experience frequent infections in the bile ducts. The risk of infection is particularly high after you've had a surgical procedure to expand a badly scarred bile duct or remove a stone blocking a bile duct.  Portal hypertension. Your portal vein is the major route for blood flowing from your digestive system into your liver. Portal hypertension refers to high blood pressure in this vein.    Portal hypertension can cause fluid from the liver to leak into your abdominal cavity (ascites). It can also divert blood from the portal vein to other veins, causing these veins to become swollen (varices). Varices are weak veins and tend to bleed easily, which can be life-threatening.    Thinning bones. People with primary sclerosing cholangitis may experience thinning bones (osteoporosis). Your doctor may recommend a bone density exam to test for osteoporosis every few years. Calcium and vitamin D supplements may be prescribed to help prevent bone loss.    Bile duct cancer. If you have primary sclerosing cholangitis, you have an increased risk of developing cancer in the bile ducts or gallbladder.    Colon cancer. People with primary sclerosing cholangitis associated with inflammatory bowel disease have an increased risk of colon cancer. If you've been diagnosed with primary sclerosing cholangitis, your doctor may  recommend testing for inflammatory bowel disease, even if you have no signs or symptoms, since the risk of colon cancer is elevated if you have both diseases.

## 2025-01-08 NOTE — PROGRESS NOTES
Hepatology Note    PATIENT: Alayna Sampson  MRN: [id number]  DATE: 1/8/2025    Provider: Hepatologist - Dr Gordon  Urgency of review: non-urgent  Referring provider: No ref. provider found    Diagnosis:   1. Primary sclerosing cholangitis    2. Cholestatic hepatitis    3. Abnormal findings on diagnostic imaging of abdomen    4. Liver disease, unspecified    5. Epigastric pain        Chief complaint:   Chief Complaint   Patient presents with    Elevated Hepatic Enzymes       Subjective:    Initial History: Alayna Sampson is a 69 y.o. female DM2 who was referred to Hepatology Clinic for consultation of Liver lesion ? PSC      01/08/2025   Patient is new to me and previously seen by Dr Moss  Patient has elevated liver labs in 9/24.Patient had been in her usual state of health and had gotten regular labs with some abnormalities in the liver tests. Seems like mostly elevated alkaline phosphatase. Evaluation was started imaging suggested a masslike lesion in the liver causing some biliary dilatation. It seems like there were concern for Klatskin tumor. She had a biopsy that did not show malignancy but showed concern for possible autoimmune hepatitis although her AST and ALT are normal.  ERCP suspected PSC but No evidence of episodes of cholangitis. She is now on Ursodiol. Today she mentions that she is getting intermittent epigastric abdominal pain after food which gets relieved with daily NSAIDS. She also notices her liver enzymes are elevated per outside records. No signs of cholangitis    As regards to liver disease,  - The patient reports no symptoms of hepatitis including malaise or flu-like symptoms to suggest a flare.  - The patient reports no new manifestations of portal hypertension including ascites, edema, GI bleeding, or hepatic encephalopathy to suggest liver decompensation.  - The patient reports no fevers/chills or pruritis to suggest biliary disease.    Outside labs were reviewed and hepatitis C was  negative, AB positive, antimitochondrial antibody negative IgG subclasses normal. Her outside path and imaging was discussed in conference by Dr Moss    8/2024  Liver, mass, core biopsy:                                               - Liver parenchyma with areas of collapse, negative for malignancy,       11/07/24 Path Conference  10/24/24 bile duct brushing:  A typical cells inflammatory cells  Not diagnostic of malignancy    10/24/24 Path Conference  10/3/24 biopsy:  No Cholangio, no tumor  Lymphocytic inflammation; stromal edema  Not a hepatitis  This can be adjacent to a mass  No definite AIH      7/2024 MRCP  Infiltrative masslike process of the central aspects of the right and left lobes of the liver which surrounds the central biliary tree and causes beading and stricturing of the intrahepatic biliary tree. Consider Klatskin's tumor (cholangiocarcinoma), but infectious or inflammatory cholangitis or less likely primary biliary cirrhosis are secondary considerations. Biopsy is recommended.      Plan: Difficult to evaluate outside MRI, which shows intrahepatic duct dilation with central stricture and central duct enhancement.  Percutaneous biopsy was done on 8/21.  Would consider ERCP and brushing.     Discussion: Previous BX ws done percutaneously,which is kind of blindly BX.   For a clad skin type tumor it is more  advantageous to do ERCP. Needs an ERCP with brushings if suspicious for cholangio.   The duct needs to be BX .  ERCP with brushing to look for a hilar  cholangio.  It does look strictured , there is enhancement of the ducts, which could be related to an inflammatory process.  The most appropriate approach to tissue sample is probably ERCP.   Needs updated imaging  (the [facility], or main campus for a better quality) and CA 19-9    ERCP 10/24--The major papilla appeared normal.                          - Irregularities which may be consistent with PSC                          were found in the left  intrahepatic branches. No                          dominant strictures or cholangiophraphic findings                          suspicious for Klatskin were seen.                          - A biliary sphincterotomy was performed.                          - Cells for cytology obtained in the left main                          hepatic duct.     Prior Relevant History:    She  denies hepatotoxic medication    Review of systems:  A review of 12+ systems was conducted with pertinent positive and negative findings documented in HPI with all other systems reviewed and negative.      PFSH:  Past medical, family, and social history reviewed as documented in chart with pertinent positive medical, family, and social history detailed in HPI.    Past Medical History:   Past Medical History:   Diagnosis Date    Diabetes mellitus     Dyslipidemia     Hypertension        Past Surgical HIstory:   Past Surgical History:   Procedure Laterality Date    ENDOSCOPIC ULTRASOUND OF UPPER GASTROINTESTINAL TRACT  10/24/2024    Procedure: ULTRASOUND, UPPER GI TRACT, ENDOSCOPIC;  Surgeon: Arnold St MD;  Location: [facility] ([address] [place] [address]);  Service: Endoscopy;;    ERCP N/A 10/24/2024    Procedure: ERCP (ENDOSCOPIC RETROGRADE CHOLANGIOPANCREATOGRAPHY);  Surgeon: Arnold St MD;  Location: [address]);  Service: Endoscopy;  Laterality: N/A;       Family History: No family history on file.  She has no known family history of liver disease.     Social History:  reports that she has never smoked. She has never been exposed to tobacco smoke. She has never used smokeless tobacco. She reports current alcohol use. She reports that she does not use drugs.    She has no history of Alcohol     She denies history of IV drug use/Tatto  She  denies high-risk sexual contacts, no raw seafood, no sick contacts      Allergies:  Review of patient's allergies indicates:   Allergen Reactions    Lisinopril Other (See Comments)     COUGH    Codeine Rash        Medications:  Current Outpatient Medications   Medication Sig Dispense Refill    ACCU-CHEK GUIDE TEST STRIPS Strp USE 1 NEW STRIP THREE TIMES DAILY AS NEEDED      amLODIPine (NORVASC) 10 MG tablet Take 1 tablet by mouth every morning.      atorvastatin (LIPITOR) 80 MG tablet Take 80 mg by mouth.      levocetirizine (XYZAL) 5 MG tablet Take 1 tablet by mouth every evening.      losartan (COZAAR) 100 MG tablet Take 100 mg by mouth.      metFORMIN (GLUCOPHAGE) 500 MG tablet Take 1,000 mg by mouth.      ondansetron (ZOFRAN) 8 MG tablet Take 1 tablet (8 mg total) by mouth every 8 (eight) hours as needed for Nausea. 15 tablet 0    oxyCODONE-acetaminophen (PERCOCET) 5-325 mg per tablet Take 1 tablet by mouth every 6 (six) hours as needed for Pain. 20 tablet 0    pantoprazole (PROTONIX) 40 MG tablet Take 1 tablet (40 mg total) by mouth once daily. 30 tablet 1    TRULICITY 1.5 mg/0.5 mL pen injector Inject 1.5 mg into the skin once a week.      ursodioL (ACTIGALL) 300 mg capsule Take 1 capsule (300 mg total) by mouth 2 (two) times daily. 60 capsule 5     No current facility-administered medications for this visit.       Review of Systems   Constitutional:  Negative for fatigue, fever and unexpected weight change.   HENT:  Negative for ear pain, nosebleeds and trouble swallowing.    Eyes:  Negative for discharge and redness.   Respiratory:  Negative for cough and shortness of breath.    Cardiovascular:  Negative for palpitations and leg swelling.   Gastrointestinal:  Negative for abdominal distention, abdominal pain, diarrhea and vomiting.   Endocrine: Negative for cold intolerance and polyuria.   Genitourinary:  Negative for flank pain and hematuria.   Musculoskeletal:  Negative for back pain.   Skin:  Negative for pallor.   Neurological:  Negative for seizures and headaches.   Hematological:  Does not bruise/bleed easily.   Psychiatric/Behavioral:  Negative for confusion and hallucinations.             Objective:       Vitals: There were no vitals filed for this visit.    Physical Exam  Constitutional:       Appearance: Normal appearance.   HENT:      Head: Normocephalic and atraumatic.      Right Ear: Tympanic membrane and external ear normal.      Left Ear: Tympanic membrane and external ear normal.      Mouth/Throat:      Mouth: Mucous membranes are moist.   Eyes:      Extraocular Movements: Extraocular movements intact.      Pupils: Pupils are equal, round, and reactive to light.   Cardiovascular:      Rate and Rhythm: Normal rate and regular rhythm.      Pulses: Normal pulses.      Heart sounds: Normal heart sounds.   Pulmonary:      Effort: Pulmonary effort is normal.      Breath sounds: Normal breath sounds.   Abdominal:      General: Bowel sounds are normal. There is no distension.      Palpations: Abdomen is soft. There is no mass.      Tenderness: There is no abdominal tenderness.   Musculoskeletal:         General: No swelling or deformity. Normal range of motion.      Cervical back: Normal range of motion and neck supple.   Skin:     Coloration: Skin is not jaundiced.   Neurological:      General: No focal deficit present.      Mental Status: She is alert and oriented to person, place, and time.      Cranial Nerves: No cranial nerve deficit.   Psychiatric:         Mood and Affect: Mood normal.         Behavior: Behavior normal.         Laboratory Data:  No visits with results within 1 Week(s) from this visit.   Latest known visit with results is:   Admission on 10/24/2024, Discharged on 10/24/2024   Component Date Value Ref Range Status    POCT Glucose 10/24/2024 150 (H)  70 - 110 mg/dL Final    Final Pathologic Diagnosis 10/24/2024  (A)   Final                    Value:LEFT HEPATIC BILE DUCT, BRUSHING:  Atypical cells present.  Favor reactive changes.       Interp By RONDA Reza M.D., Signed on 10/28/2024 at 16:42    Comment 10/24/2024 Rare atypical cells are present associated with intracytoplasmic  "neutrophils.  Reactive changes in an inflammatory setting are favored. (A)   Final    Microscopic Exam 10/24/2024 Slides examined: 1 thin prep (A)   Final    Disclaimer 10/24/2024 Screening was performed at [facility] [place] [address]. (A)   Final    POCT Glucose 10/24/2024 160 (H)  70 - 110 mg/dL Final       Lab Results   Component Value Date    INR 1.1 08/21/2024       Lab Results   Component Value Date    SMOOTHMUSCAB Negative 1:40 09/20/2024     No results found for: "IRON", "TIBC", "FERRITIN", "SATURATEDIRO"  No results found for: "HAV", "HEPAIGM", "HEPBIGM", "HEPBCAB", "HBEAG", "HEPCAB"  Lab Results   Component Value Date    TSH 0.760 05/29/2024     No results found for: "AB"    No results found for: "ABORH"        Lab Results   Component Value Date    HGBA1C 7.6 (H) 10/13/2020     Lab Results   Component Value Date    CHOL 137 05/29/2024    CHOL 142 04/05/2023    CHOL 125 04/11/2022     Lab Results   Component Value Date    HDL 51 05/29/2024    HDL 43 04/05/2023    HDL 40 04/11/2022     Lab Results   Component Value Date    LDLCALC 70 05/29/2024    LDLCALC 84 04/05/2023    LDLCALC 67 04/11/2022     Lab Results   Component Value Date    TRIG 81 05/29/2024    TRIG 75 04/05/2023    TRIG 95 04/11/2022     No results found for: "CHOLHDL"      I personally reviewed imaging studies and outside records..      Assessment:       1. Primary sclerosing cholangitis    2. Cholestatic hepatitis    3. Abnormal findings on diagnostic imaging of abdomen    4. Liver disease, unspecified    5. Epigastric pain                 Plan:     Problem List Items Addressed This Visit    None  Visit Diagnoses       Primary sclerosing cholangitis    -  Primary    Relevant Orders    CBC Auto Differential    Comprehensive Metabolic Panel    Protime-INR    MRI Abdomen W WO Contrast    CANCER ANTIGEN 19-9    Cholestatic hepatitis        Abnormal findings on diagnostic " imaging of abdomen        Liver disease, unspecified        Relevant Orders    MRI Abdomen W WO Contrast    Epigastric pain        Relevant Medications    pantoprazole (PROTONIX) 40 MG tablet            Alayna was seen today for elevated hepatic enzymes.    Diagnoses and all orders for this visit:    Primary sclerosing cholangitis  -     CBC Auto Differential; Standing  -     Comprehensive Metabolic Panel; Standing  -     Protime-INR; Standing  -     MRI Abdomen W WO Contrast; Future  -     CANCER ANTIGEN 19-9; Future    Cholestatic hepatitis    Abnormal findings on diagnostic imaging of abdomen    Liver disease, unspecified  -     MRI Abdomen W WO Contrast; Future    Epigastric pain  -     pantoprazole (PROTONIX) 40 MG tablet; Take 1 tablet (40 mg total) by mouth once daily.      PSC ?  Case was discussed with team-path and imaging at conference. No definite evidence of malignancy  -Repeat labs and imaging  -avoid Hepatotoxic medications, NSAIDS      Elevated Liver enzymes  ? NSAID related  Recheck  Fax the results of recent lab      Abdominal pain  Started on PPI      Return to clinic in 6 months.    I have sent communication to the referring physician and/or primary care provider.      Time Statement  A total time spent includes time preparing to see patient, reviewing  diagnostic studies and records, direct face-to-face visit, completing orders, medications , reconciliation, prescription management, and care coordination    We discussed in depth the nature of the patient's disease, the management plan in details. I have provided the patient with an opportunity to ask questions and have all questions answered to his satisfaction.     Discussed with patient that it is likely that she will see results before Myself or my nurse are able to view them and report results due to the Cures Act passed 4/1/21. Results will be sent immediately to the patient who are enrolled in the patient portal. If results come through  after business hours or on weekend, we will not see them until the next business day that we are in the office. If resulted during the business day, we will likely not be able to review them until after completing all patient visits in office that day.       Luis Gordon MD  Transplant Hepatologist and Gastroenterologist  [facility] [place]

## 2025-01-24 ENCOUNTER — PATIENT MESSAGE (OUTPATIENT)
Dept: HEPATOLOGY | Facility: CLINIC | Age: 70
End: 2025-01-24
Payer: COMMERCIAL

## 2025-01-24 ENCOUNTER — TELEPHONE (OUTPATIENT)
Dept: HEPATOLOGY | Facility: CLINIC | Age: 70
End: 2025-01-24
Payer: COMMERCIAL

## 2025-01-24 NOTE — TELEPHONE ENCOUNTER
----- Message from Jill sent at 1/24/2025 11:39 AM CST -----  Name of Who is Calling:YONI SOUSA [[id number]]        What is the request in detail:Pt requesting a call back from office to get MRI orders put in.        Can the clinic reply by [employer]:Call        What Number to Call Back if not in [employer]:Telephone Information:  [facility]          [phone]

## 2025-02-20 ENCOUNTER — RESULTS FOLLOW-UP (OUTPATIENT)
Dept: HEPATOLOGY | Facility: HOSPITAL | Age: 70
End: 2025-02-20

## 2025-02-20 ENCOUNTER — HOSPITAL ENCOUNTER (OUTPATIENT)
Dept: RADIOLOGY | Facility: HOSPITAL | Age: 70
Discharge: HOME | End: 2025-02-20
Attending: INTERNAL MEDICINE
Payer: COMMERCIAL

## 2025-02-20 ENCOUNTER — RESULTS FOLLOW-UP (OUTPATIENT)
Dept: HEPATOLOGY | Facility: CLINIC | Age: 70
End: 2025-02-20
Payer: COMMERCIAL

## 2025-02-20 DIAGNOSIS — K83.01 PRIMARY SCLEROSING CHOLANGITIS: ICD-10-CM

## 2025-02-20 DIAGNOSIS — K76.9 LIVER DISEASE, UNSPECIFIED: ICD-10-CM

## 2025-02-20 PROCEDURE — 74183 MRI ABD W/O CNTR FLWD CNTR: CPT | Mod: TC

## 2025-02-20 PROCEDURE — 25500020 PHARM REV CODE 255: Performed by: INTERNAL MEDICINE

## 2025-02-20 PROCEDURE — A9585 GADOBUTROL INJECTION: HCPCS | Performed by: INTERNAL MEDICINE

## 2025-02-20 RX ORDER — GADOBUTROL 604.72 MG/ML
7.5 INJECTION INTRAVENOUS
Status: COMPLETED | OUTPATIENT
Start: 2025-02-20 | End: 2025-02-20

## 2025-02-20 RX ADMIN — GADOBUTROL 7.5 ML: 604.72 INJECTION INTRAVENOUS at 10:02

## 2025-02-28 DIAGNOSIS — R10.13 EPIGASTRIC PAIN: ICD-10-CM

## 2025-02-28 RX ORDER — PANTOPRAZOLE SODIUM 40 MG/1
40 TABLET, DELAYED RELEASE ORAL
Qty: 30 TABLET | Refills: 0 | Status: SHIPPED | OUTPATIENT
Start: 2025-02-28

## 2025-02-28 NOTE — TELEPHONE ENCOUNTER
Please see the attached refill request.   [facility] [place] [address] [place]  [id number] [phone]               Thank you for choosing us for your health care visit with Keyanna Lara MD.  We are glad to serve you and happy to provide you with this summary of y (two) times daily. Also taking an additional half tab (150mg). [employer]     Sign up for [employer], your secure online medical record.   [employer] will allow you to access patient instructions from your recent visit,  view other health informati Get your heart pumping – brisk walking, biking, swimming Even 10 minute increments are effective and add up over the week   2 ½ hours per week – spread out over time Use a analisa to keep you motivated   Don’t forget strength training with weights and resist

## 2025-03-08 PROBLEM — R10.13 EPIGASTRIC PAIN: Status: ACTIVE | Noted: 2025-03-08

## 2025-03-08 PROBLEM — R93.5 ABNORMAL FINDINGS ON DIAGNOSTIC IMAGING OF ABDOMEN: Status: ACTIVE | Noted: 2025-03-08

## 2025-03-08 PROBLEM — K76.9 LIVER DISEASE, UNSPECIFIED: Status: ACTIVE | Noted: 2025-03-08

## 2025-03-08 PROBLEM — K75.89 CHOLESTATIC HEPATITIS: Status: ACTIVE | Noted: 2025-03-08

## 2025-03-08 PROBLEM — K83.01 PRIMARY SCLEROSING CHOLANGITIS: Status: ACTIVE | Noted: 2025-03-08

## 2025-03-08 NOTE — PROGRESS NOTES
Hepatology Note    PATIENT: Alayna Sampson  MRN: [id number]  DATE: 3/11/2025    Provider: Hepatologist - Dr Gordon  Urgency of review: non-urgent  Referring provider: No ref. provider found    Diagnosis:   1. Epigastric pain    2. Primary sclerosing cholangitis    3. Cholestatic hepatitis    4. Abnormal findings on diagnostic imaging of abdomen    5. Liver disease, unspecified          Chief complaint:   Chief Complaint   Patient presents with    Hepatic Disease       Subjective:    Initial History: Alayna Sampson is a 69 y.o. female DM2 who was following to Hepatology Clinic for consultation of  PSC      03/11/2025   Discussed the MRI results and new findings  Redemonstration of the ill-defined infiltrating process tracking along the central bile ducts and portal venous system. There is beading and irregularity of the biliary system. Again, findings are concerning for cholangiocarcinoma; however primary sclerosing cholangitis and inflammatory etiologies may have a similar appearance.   No evidence of cholangitis. Her liver enzymes are stable without any red flag signs  Her ERCP in Oct 2024 required dilatation but no stent placed      2/2025  Patient is new to me and previously seen by Dr Moss  Patient has elevated liver labs in 9/24.Patient had been in her usual state of health and had gotten regular labs with some abnormalities in the liver tests. Seems like mostly elevated alkaline phosphatase. Evaluation was started imaging suggested a masslike lesion in the liver causing some biliary dilatation. It seems like there were concern for Klatskin tumor. She had a biopsy that did not show malignancy but showed concern for possible autoimmune hepatitis although her AST and ALT are normal.  ERCP suspected PSC but No evidence of episodes of cholangitis. She is now on Ursodiol. Today she mentions that she is getting intermittent epigastric abdominal pain after food which gets relieved with daily NSAIDS. She also notices  her liver enzymes are elevated per outside records. No signs of cholangitis    As regards to liver disease,  - The patient reports no symptoms of hepatitis including malaise or flu-like symptoms to suggest a flare.  - The patient reports no new manifestations of portal hypertension including ascites, edema, GI bleeding, or hepatic encephalopathy to suggest liver decompensation.  - The patient reports no fevers/chills or pruritis to suggest biliary disease.    Outside labs were reviewed and hepatitis C was negative, AB positive, antimitochondrial antibody negative IgG subclasses normal. Her outside path and imaging was discussed in conference by Dr Moss    8/2024  Liver, mass, core biopsy:                                               - Liver parenchyma with areas of collapse, negative for malignancy,       11/07/24 Path Conference  10/24/24 bile duct brushing:  A typical cells inflammatory cells  Not diagnostic of malignancy    10/24/24 Path Conference  10/3/24 biopsy:  No Cholangio, no tumor  Lymphocytic inflammation; stromal edema  Not a hepatitis  This can be adjacent to a mass  No definite AIH      7/2024 MRCP  Infiltrative masslike process of the central aspects of the right and left lobes of the liver which surrounds the central biliary tree and causes beading and stricturing of the intrahepatic biliary tree. Consider Klatskin's tumor (cholangiocarcinoma), but infectious or inflammatory cholangitis or less likely primary biliary cirrhosis are secondary considerations. Biopsy is recommended.      Plan: Difficult to evaluate outside MRI, which shows intrahepatic duct dilation with central stricture and central duct enhancement.  Percutaneous biopsy was done on 8/21.  Would consider ERCP and brushing.     Discussion: Previous BX ws done percutaneously,which is kind of blindly BX.   For a clad skin type tumor it is more  advantageous to do ERCP. Needs an ERCP with brushings if suspicious for cholangio.   The  duct needs to be BX .  ERCP with brushing to look for a hilar  cholangio.  It does look strictured , there is enhancement of the ducts, which could be related to an inflammatory process.  The most appropriate approach to tissue sample is probably ERCP.   Needs updated imaging  (the [facility], or main campus for a better quality) and CA 19-9    ERCP 10/24--The major papilla appeared normal.                          - Irregularities which may be consistent with PSC                          were found in the left intrahepatic branches. No                          dominant strictures or cholangiophraphic findings                          suspicious for Klatskin were seen.                          - A biliary sphincterotomy was performed.                          - Cells for cytology obtained in the left main                          hepatic duct.     Prior Relevant History:    She  denies hepatotoxic medication    Review of systems:  A review of 12+ systems was conducted with pertinent positive and negative findings documented in HPI with all other systems reviewed and negative.      PFSH:  Past medical, family, and social history reviewed as documented in chart with pertinent positive medical, family, and social history detailed in HPI.    Past Medical History:   Past Medical History:   Diagnosis Date    Diabetes mellitus     Dyslipidemia     Hypertension        Past Surgical HIstory:   Past Surgical History:   Procedure Laterality Date    ENDOSCOPIC ULTRASOUND OF UPPER GASTROINTESTINAL TRACT  10/24/2024    Procedure: ULTRASOUND, UPPER GI TRACT, ENDOSCOPIC;  Surgeon: Arnold St MD;  Location: [address]);  Service: Endoscopy;;    ERCP N/A 10/24/2024    Procedure: ERCP (ENDOSCOPIC RETROGRADE CHOLANGIOPANCREATOGRAPHY);  Surgeon: Arnold St MD;  Location: [address]);  Service: Endoscopy;  Laterality: N/A;       Family History: No family history on file.  She has no known family history of liver  disease.     Social History:  reports that she has never smoked. She has never been exposed to tobacco smoke. She has never used smokeless tobacco. She reports current alcohol use. She reports that she does not use drugs.    She has no history of Alcohol     She denies history of IV drug use/Tatto  She  denies high-risk sexual contacts, no raw seafood, no sick contacts      Allergies:  Review of patient's allergies indicates:   Allergen Reactions    Lisinopril Other (See Comments)     COUGH    Codeine Rash       Medications:  Current Outpatient Medications   Medication Sig Dispense Refill    ACCU-CHEK GUIDE TEST STRIPS Strp USE 1 NEW STRIP THREE TIMES DAILY AS NEEDED      amLODIPine (NORVASC) 10 MG tablet Take 1 tablet by mouth every morning.      atorvastatin (LIPITOR) 80 MG tablet Take 80 mg by mouth.      levocetirizine (XYZAL) 5 MG tablet Take 1 tablet by mouth every evening.      losartan (COZAAR) 100 MG tablet Take 100 mg by mouth.      metFORMIN (GLUCOPHAGE) 500 MG tablet Take 1,000 mg by mouth.      ondansetron (ZOFRAN) 8 MG tablet Take 1 tablet (8 mg total) by mouth every 8 (eight) hours as needed for Nausea. 15 tablet 0    oxyCODONE-acetaminophen (PERCOCET) 5-325 mg per tablet Take 1 tablet by mouth every 6 (six) hours as needed for Pain. 20 tablet 0    pantoprazole (PROTONIX) 40 MG tablet Take 1 tablet by mouth once daily 30 tablet 0    TRULICITY 1.5 mg/0.5 mL pen injector Inject 1.5 mg into the skin once a week.      ursodioL (ACTIGALL) 300 mg capsule Take 1 capsule (300 mg total) by mouth 2 (two) times daily. 60 capsule 5     No current facility-administered medications for this visit.       Review of Systems   Constitutional:  Negative for fatigue, fever and unexpected weight change.   HENT:  Negative for ear pain, nosebleeds and trouble swallowing.    Eyes:  Negative for discharge and redness.   Respiratory:  Negative for cough and shortness of breath.    Cardiovascular:  Negative for palpitations and  leg swelling.   Gastrointestinal:  Negative for abdominal distention, abdominal pain, diarrhea and vomiting.   Endocrine: Negative for cold intolerance and polyuria.   Genitourinary:  Negative for flank pain and hematuria.   Musculoskeletal:  Negative for back pain.   Skin:  Negative for pallor.   Neurological:  Negative for seizures and headaches.   Hematological:  Does not bruise/bleed easily.   Psychiatric/Behavioral:  Negative for confusion and hallucinations.             Objective:      Vitals: There were no vitals filed for this visit.    Physical Exam  Constitutional:       Appearance: Normal appearance.   HENT:      Head: Normocephalic and atraumatic.      Right Ear: Tympanic membrane and external ear normal.      Left Ear: Tympanic membrane and external ear normal.      Mouth/Throat:      Mouth: Mucous membranes are moist.   Eyes:      Extraocular Movements: Extraocular movements intact.      Pupils: Pupils are equal, round, and reactive to light.   Cardiovascular:      Rate and Rhythm: Normal rate and regular rhythm.      Pulses: Normal pulses.      Heart sounds: Normal heart sounds.   Pulmonary:      Effort: Pulmonary effort is normal.      Breath sounds: Normal breath sounds.   Abdominal:      General: Bowel sounds are normal. There is no distension.      Palpations: Abdomen is soft. There is no mass.      Tenderness: There is no abdominal tenderness.   Musculoskeletal:         General: No swelling or deformity. Normal range of motion.      Cervical back: Normal range of motion and neck supple.   Skin:     Coloration: Skin is not jaundiced.   Neurological:      General: No focal deficit present.      Mental Status: She is alert and oriented to person, place, and time.      Cranial Nerves: No cranial nerve deficit.   Psychiatric:         Mood and Affect: Mood normal.         Behavior: Behavior normal.         Laboratory Data:  No visits with results within 1 Week(s) from this visit.   Latest known visit  with results is:   Lab Visit on 02/20/2025   Component Date Value Ref Range Status    WBC 02/20/2025 6.95  3.90 - 12.70 K/uL Final    RBC 02/20/2025 3.75 (L)  4.00 - 5.40 M/uL Final    Hemoglobin 02/20/2025 9.9 (L)  12.0 - 16.0 g/dL Final    Hematocrit 02/20/2025 31.4 (L)  37.0 - 48.5 % Final    MCV 02/20/2025 84  82 - 98 fL Final    MCH 02/20/2025 26.4 (L)  27.0 - 31.0 pg Final    MCHC 02/20/2025 31.5 (L)  32.0 - 36.0 g/dL Final    RDW 02/20/2025 17.8 (H)  11.5 - 14.5 % Final    Platelets 02/20/2025 695 (H)  150 - 450 K/uL Final    MPV 02/20/2025 9.7  9.2 - 12.9 fL Final    Immature Granulocytes 02/20/2025 0.1  0.0 - 0.5 % Final    Gran # (ANC) 02/20/2025 4.9  1.8 - 7.7 K/uL Final    Immature Grans (Abs) 02/20/2025 0.01  0.00 - 0.04 K/uL Final    Comment: Mild elevation in immature granulocytes is non specific and   can be seen in a variety of conditions including stress response,   acute inflammation, trauma and pregnancy. Correlation with other   laboratory and clinical findings is essential.      Lymph # 02/20/2025 1.5  1.0 - 4.8 K/uL Final    Mono # 02/20/2025 0.5  0.3 - 1.0 K/uL Final    Eos # 02/20/2025 0.0  0.0 - 0.5 K/uL Final    Baso # 02/20/2025 0.04  0.00 - 0.20 K/uL Final    nRBC 02/20/2025 0  0 /100 WBC Final    Gran % 02/20/2025 70.3  38.0 - 73.0 % Final    Lymph % 02/20/2025 21.3  18.0 - 48.0 % Final    Mono % 02/20/2025 7.1  4.0 - 15.0 % Final    Eosinophil % 02/20/2025 0.6  0.0 - 8.0 % Final    Basophil % 02/20/2025 0.6  0.0 - 1.9 % Final    Differential Method 02/20/2025 Automated   Final    Sodium 02/20/2025 142  136 - 145 mmol/L Final    Potassium 02/20/2025 4.0  3.5 - 5.1 mmol/L Final    Chloride 02/20/2025 104  95 - 110 mmol/L Final    CO2 02/20/2025 26  23 - 29 mmol/L Final    Glucose 02/20/2025 148 (H)  70 - 110 mg/dL Final    BUN 02/20/2025 10  8 - 23 mg/dL Final    Creatinine 02/20/2025 0.7  0.5 - 1.4 mg/dL Final    Calcium 02/20/2025 10.0  8.7 - 10.5 mg/dL Final    Total Protein  "02/20/2025 8.8 (H)  6.0 - 8.4 g/dL Final    Albumin 02/20/2025 3.3 (L)  3.5 - 5.2 g/dL Final    Total Bilirubin 02/20/2025 0.8  0.1 - 1.0 mg/dL Final    Comment: For infants and newborns, interpretation of results should be based  on gestational age, weight and in agreement with clinical  observations.    Premature Infant recommended reference ranges:  Up to 24 hours.............<8.0 mg/dL  Up to 48 hours............<12.0 mg/dL  3-5 days..................<15.0 mg/dL  6-29 days.................<15.0 mg/dL      Alkaline Phosphatase 02/20/2025 289 (H)  40 - 150 U/L Final    AST 02/20/2025 29  10 - 40 U/L Final    ALT 02/20/2025 22  10 - 44 U/L Final    eGFR 02/20/2025 >60  >60 mL/min/1.73 m^2 Final    Anion Gap 02/20/2025 12  8 - 16 mmol/L Final    Prothrombin Time 02/20/2025 11.3  9.0 - 12.5 sec Final    INR 02/20/2025 1.0  0.8 - 1.2 Final    Comment: Coumadin Therapy:  2.0 - 3.0 for INR for all indicators except mechanical heart valves  and antiphospholipid syndromes which should use 2.5 - 3.5.      CA 19-9 02/20/2025 16.7  0.0 - 40.0 U/mL Final    Comment: The testing method is a chemiluminescent microparticle immunoassay   manufactured by [employer] and performed on the ethority   or   [employer] system. Values obtained with different assay manufacturers   for   methods may be different and cannot be used interchangeably.         Lab Results   Component Value Date    INR 1.0 02/20/2025    INR 1.1 08/21/2024       Lab Results   Component Value Date    SMOOTHMUSCAB Negative 1:40 09/20/2024     No results found for: "IRON", "TIBC", "FERRITIN", "SATURATEDIRO"  No results found for: "HAV", "HEPAIGM", "HEPBIGM", "HEPBCAB", "HBEAG", "HEPCAB"  Lab Results   Component Value Date    TSH 0.760 05/29/2024     No results found for: "AB"    No results found for: "ABORH"        Lab Results   Component Value Date    HGBA1C 7.6 (H) 10/13/2020     Lab Results   Component Value Date    CHOL 137 05/29/2024    CHOL 142 " "04/05/2023    CHOL 125 04/11/2022     Lab Results   Component Value Date    HDL 51 05/29/2024    HDL 43 04/05/2023    HDL 40 04/11/2022     Lab Results   Component Value Date    LDLCALC 70 05/29/2024    LDLCALC 84 04/05/2023    LDLCALC 67 04/11/2022     Lab Results   Component Value Date    TRIG 81 05/29/2024    TRIG 75 04/05/2023    TRIG 95 04/11/2022     No results found for: "CHOLHDL"      I personally reviewed imaging studies and outside records..      Assessment:       1. Epigastric pain    2. Primary sclerosing cholangitis    3. Cholestatic hepatitis    4. Abnormal findings on diagnostic imaging of abdomen    5. Liver disease, unspecified                   Plan:     Problem List Items Addressed This Visit          GI    Abnormal findings on diagnostic imaging of abdomen    Cholestatic hepatitis    Epigastric pain - Primary    Liver disease, unspecified    Relevant Orders    MRI Abdomen W WO Contrast    Primary sclerosing cholangitis    Relevant Orders    MRI Abdomen W WO Contrast    CBC Auto Differential    Comprehensive Metabolic Panel    Protime-INR    Cancer Antigen 19-9         Alayna was seen today for hepatic disease.    Diagnoses and all orders for this visit:    Epigastric pain    Primary sclerosing cholangitis  -     MRI Abdomen W WO Contrast; Future  -     CBC Auto Differential; Future  -     Comprehensive Metabolic Panel; Future  -     Protime-INR; Future  -     Cancer Antigen 19-9; Future    Cholestatic hepatitis    Abnormal findings on diagnostic imaging of abdomen    Liver disease, unspecified  -     MRI Abdomen W WO Contrast; Future        PSC ?  Case was discussed with team-path and imaging at conference. No definite evidence of malignancy  -Repeat labs and imaging in 3 months  -avoid Hepatotoxic medications, NSAIDS      Elevated Liver enzymes  resolved  Monitor      Abdominal pain  on PPI resolved      Return to clinic in 6 months.    I have sent communication to the referring physician " and/or primary care provider.      I performed this consultation using real-time Telehealth tools, including a live video connection between my location and the patient's location. Prior to initiating the consultation, I obtained informed verbal consent to perform this consultation using Telehealth tools and answered all the questions about the Telehealth interaction.    The patient location is: ([place])   The chief complaint leading to consultation is: PSC      Visit type: audiovisual     Face to Face time with patient:20  45  minutes of total time spent on the encounter, which includes face to face time and non-face to face time preparing to see the patient (eg, review of tests), Obtaining and/or reviewing separately obtained history, Documenting clinical information in the electronic or other health record, Independently interpreting results (not separately reported) and communicating results to the patient/family/caregiver, or Care coordination (not separately reported).     Each patient to whom he or she provides medical services by telemedicine is:  (1) informed of the relationship between the physician and patient and the respective role of any other health care provider with respect to management of the patient; and (2) notified that he or she may decline to receive medical services by telemedicine and may withdraw from such care at any time.    I performed this consultation using real-time Telehealth tools, including a live video connection between my location and the patient's location. Prior to initiating the consultation, I obtained informed verbal consent to perform this consultation using Telehealth tools and answered all the questions about the Telehealth interaction.    The patient location is: ([place])   The chief complaint leading to consultation is: PSC      Visit type: audiovisual     Face to Face time with patient:20    60  minutes of total time spent on the encounter, which includes face to face time  and non-face to face time preparing to see the patient (eg, review of tests), Obtaining and/or reviewing separately obtained history, Documenting clinical information in the electronic or other health record, Independently interpreting results (not separately reported) and communicating results to the patient/family/caregiver, or Care coordination (not separately reported).     Each patient to whom he or she provides medical services by telemedicine is:  (1) informed of the relationship between the physician and patient and the respective role of any other health care provider with respect to management of the patient; and (2) notified that he or she may decline to receive medical services by telemedicine and may withdraw from such care at any time.    We discussed in depth the nature of the patient's disease, the management plan in details. I have provided the patient with an opportunity to ask questions and have all questions answered to his satisfaction.     Discussed with patient that it is likely that she will see results before Myself or my nurse are able to view them and report results due to the Cures Act passed 4/1/21. Results will be sent immediately to the patient who are enrolled in the patient portal. If results come through after business hours or on weekend, we will not see them until the next business day that we are in the office. If resulted during the business day, we will likely not be able to review them until after completing all patient visits in office that day.       Luis Gordon MD  Transplant Hepatologist and Gastroenterologist  [facility] [place]

## 2025-03-11 ENCOUNTER — TELEPHONE (OUTPATIENT)
Dept: HEPATOLOGY | Facility: CLINIC | Age: 70
End: 2025-03-11

## 2025-03-11 ENCOUNTER — OFFICE VISIT (OUTPATIENT)
Dept: HEPATOLOGY | Facility: CLINIC | Age: 70
End: 2025-03-11
Payer: COMMERCIAL

## 2025-03-11 DIAGNOSIS — K75.89 CHOLESTATIC HEPATITIS: ICD-10-CM

## 2025-03-11 DIAGNOSIS — K83.01 PRIMARY SCLEROSING CHOLANGITIS: ICD-10-CM

## 2025-03-11 DIAGNOSIS — R93.5 ABNORMAL FINDINGS ON DIAGNOSTIC IMAGING OF ABDOMEN: ICD-10-CM

## 2025-03-11 DIAGNOSIS — R10.13 EPIGASTRIC PAIN: Primary | ICD-10-CM

## 2025-03-11 DIAGNOSIS — K76.9 LIVER DISEASE, UNSPECIFIED: ICD-10-CM

## 2025-03-11 PROCEDURE — 1160F RVW MEDS BY RX/DR IN RCRD: CPT | Mod: CPTII,95,, | Performed by: INTERNAL MEDICINE

## 2025-03-11 PROCEDURE — 98007 SYNCH AUDIO-VIDEO EST HI 40: CPT | Mod: 95,,, | Performed by: INTERNAL MEDICINE

## 2025-03-11 PROCEDURE — 1159F MED LIST DOCD IN RCRD: CPT | Mod: CPTII,95,, | Performed by: INTERNAL MEDICINE

## 2025-03-11 PROCEDURE — 4010F ACE/ARB THERAPY RXD/TAKEN: CPT | Mod: CPTII,95,, | Performed by: INTERNAL MEDICINE

## 2025-03-11 NOTE — TELEPHONE ENCOUNTER
"Patient: Alayna Sampson       MRN: [id number]      : 1955     Age: 69 y.o.  [address]  [place] [address]      Providers  Hepatologists: Luis Gordon MD    Priority of review: Cancer    Patient Transplant Status: Hepatology    Reason for presentation: Reassessment    Clinical Summary: 68F with cholestatic hep imaging was concerning for Klatskin but bx neg for malignancy outside questions autoimmune hepatitis but actually labs and bx not suggestive of autoimmune hep, need to make sure no concern for cancer.     Imaging to be reviewed: MRI/MRCP    HCC Treatment History: no        Platelets:   Lab Results   Component Value Date/Time     (H) 2025 08:56 AM     Creatinine:   Lab Results   Component Value Date/Time    CREATININE 0.7 2025 08:57 AM     Bilirubin:   Lab Results   Component Value Date/Time    BILITOT 0.8 2025 08:57 AM     AFP Last 3 each if available: No results found for: "AFP", "EXTAFP"    MELD: MELD 3.0: 8 at 2025  8:57 AM  MELD-Na: 6 at 2025  8:57 AM  Calculated from:  Serum Creatinine: 0.7 mg/dL (Using min of 1 mg/dL) at 2025  8:57 AM  Serum Sodium: 142 mmol/L (Using max of 137 mmol/L) at 2025  8:57 AM  Total Bilirubin: 0.8 mg/dL (Using min of 1 mg/dL) at 2025  8:57 AM  Serum Albumin: 3.3 g/dL at 2025  8:57 AM  INR(ratio): 1 at 2025  8:36 AM  Age at listing (hypothetical): 69 years  Sex: Female at 2025  8:57 AM      Plan:     Follow-up Provider:   "

## 2025-03-18 ENCOUNTER — CONFERENCE (OUTPATIENT)
Dept: TRANSPLANT | Facility: CLINIC | Age: 70
End: 2025-03-18
Payer: COMMERCIAL

## 2025-03-18 NOTE — TELEPHONE ENCOUNTER
"Patient: Alayna Sampson       MRN: [id number]      : 1955     Age: 69 y.o.  [address]  [facility] [address]        Providers  Hepatologists: Luis Gordon MD     Priority of review: Cancer     Patient Transplant Status: Hepatology     Reason for presentation: Reassessment     Clinical Summary: 68F with cholestatic hep imaging was concerning for Klatskin but bx neg for malignancy outside questions autoimmune hepatitis but actually labs and bx not suggestive of autoimmune hep, need to make sure no concern for cancer.      Imaging to be reviewed: MRI/MRCP     HCC Treatment History: no           Platelets:         Lab Results   Component Value Date/Time      (H) 2025 08:56 AM      Creatinine:         Lab Results   Component Value Date/Time     CREATININE 0.7 2025 08:57 AM      Bilirubin:         Lab Results   Component Value Date/Time     BILITOT 0.8 2025 08:57 AM      AFP Last 3 each if available: No results found for: "AFP", "EXTAFP"     MELD: MELD 3.0: 8 at 2025  8:57 AM  MELD-Na: 6 at 2025  8:57 AM  Calculated from:  Serum Creatinine: 0.7 mg/dL (Using min of 1 mg/dL) at 2025  8:57 AM  Serum Sodium: 142 mmol/L (Using max of 137 mmol/L) at 2025  8:57 AM  Total Bilirubin: 0.8 mg/dL (Using min of 1 mg/dL) at 2025  8:57 AM  Serum Albumin: 3.3 g/dL at 2025  8:57 AM  INR(ratio): 1 at 2025  8:36 AM  Age at listing (hypothetical): 69 years  Sex: Female at 2025  8:57 AM        Plan: Increased geographic signal intensity in the parenchyma surrounding central hilar structures including the portal vein and bile ducts. Evidence of biliary obstruction with intrahepatic ductal dilation. No defintinive mass. Findings may reflect PSC, cholangiocarcinoma, or another inflammatory process. Previous liver biopsy and brush biopsies neagtive for malignancy.        Discussion:Geographic area of enhancement and the hilum around the regis hepatis looks like " there are some ducts that are dilated .  One focus that arterially enhancing about 8.6mm seen on this imaging only.   Brush biopsied done and percutaneously biopsied in the vicinity of the of where this geographic enhancement pattern  would be. Can't definitively say that there mass there is this enhancement pattern here  Could be PSC. There is a kind of speed zone biliary ductal dilation appearance to it.  No growth but geographic enhancement around the bile ducts and portal veins.  No discrete mass, no elevated tumor markers and no dominant stricture .  Follow closely with imaging    3 month MRI/MRCP     Note sent to Dr Gordon staff for follow up.     Follow-up Provider: Dr Gordon

## 2025-03-21 ENCOUNTER — PATIENT MESSAGE (OUTPATIENT)
Dept: HEPATOLOGY | Facility: CLINIC | Age: 70
End: 2025-03-21
Payer: COMMERCIAL

## 2025-03-21 DIAGNOSIS — K76.9 LIVER DISEASE, UNSPECIFIED: Primary | ICD-10-CM

## 2025-03-27 DIAGNOSIS — R10.13 EPIGASTRIC PAIN: ICD-10-CM

## 2025-03-27 RX ORDER — PANTOPRAZOLE SODIUM 40 MG/1
40 TABLET, DELAYED RELEASE ORAL
Qty: 30 TABLET | Refills: 0 | Status: SHIPPED | OUTPATIENT
Start: 2025-03-27

## 2025-04-22 DIAGNOSIS — R10.13 EPIGASTRIC PAIN: ICD-10-CM

## 2025-04-22 RX ORDER — PANTOPRAZOLE SODIUM 40 MG/1
40 TABLET, DELAYED RELEASE ORAL
Qty: 30 TABLET | Refills: 0 | Status: SHIPPED | OUTPATIENT
Start: 2025-04-22

## 2025-05-19 DIAGNOSIS — R10.13 EPIGASTRIC PAIN: ICD-10-CM

## 2025-05-20 RX ORDER — PANTOPRAZOLE SODIUM 40 MG/1
40 TABLET, DELAYED RELEASE ORAL DAILY
Qty: 30 TABLET | Refills: 0 | Status: SHIPPED | OUTPATIENT
Start: 2025-05-20

## 2025-06-03 DIAGNOSIS — K76.9 LIVER DISEASE, UNSPECIFIED: Primary | ICD-10-CM

## 2025-06-11 ENCOUNTER — HOSPITAL ENCOUNTER (OUTPATIENT)
Dept: RADIOLOGY | Facility: HOSPITAL | Age: 70
Discharge: HOME | End: 2025-06-11
Attending: INTERNAL MEDICINE
Payer: COMMERCIAL

## 2025-06-11 ENCOUNTER — RESULTS FOLLOW-UP (OUTPATIENT)
Dept: HEPATOLOGY | Facility: CLINIC | Age: 70
End: 2025-06-11

## 2025-06-11 DIAGNOSIS — K76.9 LIVER DISEASE, UNSPECIFIED: ICD-10-CM

## 2025-06-11 PROCEDURE — A9585 GADOBUTROL INJECTION: HCPCS | Performed by: INTERNAL MEDICINE

## 2025-06-11 PROCEDURE — 25500020 PHARM REV CODE 255: Performed by: INTERNAL MEDICINE

## 2025-06-11 PROCEDURE — 74183 MRI ABD W/O CNTR FLWD CNTR: CPT | Mod: TC

## 2025-06-11 RX ORDER — GADOBUTROL 604.72 MG/ML
7.5 INJECTION INTRAVENOUS
Status: COMPLETED | OUTPATIENT
Start: 2025-06-11 | End: 2025-06-11

## 2025-06-11 RX ADMIN — GADOBUTROL 7.5 ML: 604.72 INJECTION INTRAVENOUS at 10:06

## 2025-06-12 DIAGNOSIS — K76.9 LIVER DISEASE, UNSPECIFIED: Primary | ICD-10-CM

## 2025-06-14 DIAGNOSIS — R10.13 EPIGASTRIC PAIN: ICD-10-CM

## 2025-06-16 RX ORDER — PANTOPRAZOLE SODIUM 40 MG/1
40 TABLET, DELAYED RELEASE ORAL
Qty: 30 TABLET | Refills: 0 | Status: SHIPPED | OUTPATIENT
Start: 2025-06-16

## 2025-07-02 ENCOUNTER — PATIENT MESSAGE (OUTPATIENT)
Dept: HEPATOLOGY | Facility: CLINIC | Age: 70
End: 2025-07-02
Payer: COMMERCIAL

## 2025-07-02 DIAGNOSIS — K83.01 PRIMARY SCLEROSING CHOLANGITIS: ICD-10-CM

## 2025-07-02 RX ORDER — URSODIOL 300 MG/1
300 CAPSULE ORAL 2 TIMES DAILY
Qty: 60 CAPSULE | Refills: 5 | Status: SHIPPED | OUTPATIENT
Start: 2025-07-02 | End: 2026-07-02

## 2025-07-10 DIAGNOSIS — R10.13 EPIGASTRIC PAIN: ICD-10-CM

## 2025-07-10 RX ORDER — PANTOPRAZOLE SODIUM 40 MG/1
40 TABLET, DELAYED RELEASE ORAL
Qty: 30 TABLET | Refills: 0 | Status: SHIPPED | OUTPATIENT
Start: 2025-07-10

## 2025-08-03 DIAGNOSIS — R10.13 EPIGASTRIC PAIN: ICD-10-CM

## 2025-08-04 RX ORDER — PANTOPRAZOLE SODIUM 40 MG/1
40 TABLET, DELAYED RELEASE ORAL
Qty: 30 TABLET | Refills: 0 | Status: SHIPPED | OUTPATIENT
Start: 2025-08-04

## 2025-08-23 ENCOUNTER — LAB VISIT (OUTPATIENT)
Dept: LAB | Facility: HOSPITAL | Age: 70
End: 2025-08-23
Attending: INTERNAL MEDICINE
Payer: COMMERCIAL

## 2025-08-23 ENCOUNTER — RESULTS FOLLOW-UP (OUTPATIENT)
Dept: HEPATOLOGY | Facility: CLINIC | Age: 70
End: 2025-08-23
Payer: COMMERCIAL

## 2025-08-23 DIAGNOSIS — K76.9 LIVER DISEASE, UNSPECIFIED: ICD-10-CM

## 2025-08-23 DIAGNOSIS — R93.5 ABNORMAL FINDINGS ON DIAGNOSTIC IMAGING OF ABDOMEN: ICD-10-CM

## 2025-08-23 DIAGNOSIS — K75.89 CHOLESTATIC HEPATITIS: ICD-10-CM

## 2025-08-23 LAB
ABSOLUTE EOSINOPHIL (OHS): 0.03 K/UL
ABSOLUTE MONOCYTE (OHS): 0.74 K/UL (ref 0.3–1)
ABSOLUTE NEUTROPHIL COUNT (OHS): 7.09 K/UL (ref 1.8–7.7)
ALBUMIN SERPL BCP-MCNC: 3.1 G/DL (ref 3.5–5.2)
ALP SERPL-CCNC: 361 UNIT/L (ref 40–150)
ALT SERPL W/O P-5'-P-CCNC: 25 UNIT/L (ref 10–44)
ANION GAP (OHS): 13 MMOL/L (ref 8–16)
AST SERPL-CCNC: 38 UNIT/L (ref 11–45)
BASOPHILS # BLD AUTO: 0.06 K/UL
BASOPHILS NFR BLD AUTO: 0.6 %
BILIRUB SERPL-MCNC: 0.4 MG/DL (ref 0.1–1)
BUN SERPL-MCNC: 12 MG/DL (ref 8–23)
CALCIUM SERPL-MCNC: 10.1 MG/DL (ref 8.7–10.5)
CHLORIDE SERPL-SCNC: 102 MMOL/L (ref 95–110)
CO2 SERPL-SCNC: 27 MMOL/L (ref 23–29)
CREAT SERPL-MCNC: 0.6 MG/DL (ref 0.5–1.4)
CREAT SERPL-MCNC: 0.7 MG/DL (ref 0.5–1.4)
ERYTHROCYTE [DISTWIDTH] IN BLOOD BY AUTOMATED COUNT: 18.1 % (ref 11.5–14.5)
GFR SERPLBLD CREATININE-BSD FMLA CKD-EPI: >60 ML/MIN/1.73/M2
GFR SERPLBLD CREATININE-BSD FMLA CKD-EPI: >60 ML/MIN/1.73/M2
GGT SERPL-CCNC: 272 U/L (ref 8–55)
GLUCOSE SERPL-MCNC: 155 MG/DL (ref 70–110)
HCT VFR BLD AUTO: 31.8 % (ref 37–48.5)
HGB BLD-MCNC: 10 GM/DL (ref 12–16)
IMM GRANULOCYTES # BLD AUTO: 0.05 K/UL (ref 0–0.04)
IMM GRANULOCYTES NFR BLD AUTO: 0.5 % (ref 0–0.5)
LYMPHOCYTES # BLD AUTO: 2.56 K/UL (ref 1–4.8)
MCH RBC QN AUTO: 24.6 PG (ref 27–31)
MCHC RBC AUTO-ENTMCNC: 31.4 G/DL (ref 32–36)
MCV RBC AUTO: 78 FL (ref 82–98)
NUCLEATED RBC (/100WBC) (OHS): 0 /100 WBC
PLATELET # BLD AUTO: 596 K/UL (ref 150–450)
PMV BLD AUTO: 10.3 FL (ref 9.2–12.9)
POTASSIUM SERPL-SCNC: 4.6 MMOL/L (ref 3.5–5.1)
PROT SERPL-MCNC: 8.6 GM/DL (ref 6–8.4)
RBC # BLD AUTO: 4.06 M/UL (ref 4–5.4)
RELATIVE EOSINOPHIL (OHS): 0.3 %
RELATIVE LYMPHOCYTE (OHS): 24.3 % (ref 18–48)
RELATIVE MONOCYTE (OHS): 7 % (ref 4–15)
RELATIVE NEUTROPHIL (OHS): 67.3 % (ref 38–73)
SODIUM SERPL-SCNC: 142 MMOL/L (ref 136–145)
WBC # BLD AUTO: 10.53 K/UL (ref 3.9–12.7)

## 2025-08-23 PROCEDURE — 82565 ASSAY OF CREATININE: CPT

## 2025-08-23 PROCEDURE — 82977 ASSAY OF GGT: CPT

## 2025-08-23 PROCEDURE — 82040 ASSAY OF SERUM ALBUMIN: CPT

## 2025-08-23 PROCEDURE — 85025 COMPLETE CBC W/AUTO DIFF WBC: CPT

## 2025-08-23 PROCEDURE — 36415 COLL VENOUS BLD VENIPUNCTURE: CPT

## 2025-08-29 DIAGNOSIS — R10.13 EPIGASTRIC PAIN: ICD-10-CM

## 2025-08-29 RX ORDER — PANTOPRAZOLE SODIUM 40 MG/1
40 TABLET, DELAYED RELEASE ORAL
Qty: 30 TABLET | Refills: 0 | Status: SHIPPED | OUTPATIENT
Start: 2025-08-29